# Patient Record
Sex: MALE | Race: WHITE | NOT HISPANIC OR LATINO
[De-identification: names, ages, dates, MRNs, and addresses within clinical notes are randomized per-mention and may not be internally consistent; named-entity substitution may affect disease eponyms.]

---

## 2017-08-03 ENCOUNTER — APPOINTMENT (OUTPATIENT)
Dept: CARDIOLOGY | Facility: CLINIC | Age: 77
End: 2017-08-03
Payer: MEDICARE

## 2017-08-03 VITALS
HEIGHT: 67 IN | WEIGHT: 253 LBS | SYSTOLIC BLOOD PRESSURE: 120 MMHG | BODY MASS INDEX: 39.71 KG/M2 | HEART RATE: 74 BPM | DIASTOLIC BLOOD PRESSURE: 70 MMHG | OXYGEN SATURATION: 97 %

## 2017-08-03 PROCEDURE — 99205 OFFICE O/P NEW HI 60 MIN: CPT

## 2017-08-03 RX ORDER — ALBUTEROL SULFATE 90 UG/1
108 (90 BASE) AEROSOL, METERED RESPIRATORY (INHALATION)
Qty: 1 | Refills: 3 | Status: ACTIVE | COMMUNITY
Start: 2017-08-03

## 2017-08-03 RX ORDER — BUDESONIDE AND FORMOTEROL FUMARATE DIHYDRATE 160; 4.5 UG/1; UG/1
160-4.5 AEROSOL RESPIRATORY (INHALATION) TWICE DAILY
Qty: 1 | Refills: 3 | Status: ACTIVE | COMMUNITY
Start: 2016-11-14

## 2017-08-03 RX ORDER — AZITHROMYCIN 250 MG/1
250 TABLET, FILM COATED ORAL
Qty: 6 | Refills: 0 | Status: COMPLETED | COMMUNITY
Start: 2017-03-22

## 2017-08-03 RX ORDER — OXYCODONE AND ACETAMINOPHEN 5; 325 MG/1; MG/1
5-325 TABLET ORAL
Qty: 30 | Refills: 0 | Status: COMPLETED | COMMUNITY
Start: 2017-06-27

## 2017-08-03 RX ORDER — TIOTROPIUM BROMIDE 18 UG/1
18 CAPSULE ORAL; RESPIRATORY (INHALATION) DAILY
Qty: 14 | Refills: 0 | Status: ACTIVE | COMMUNITY
Start: 2016-11-14

## 2017-08-03 RX ORDER — FUROSEMIDE 20 MG/1
20 TABLET ORAL
Qty: 90 | Refills: 0 | Status: COMPLETED | COMMUNITY
Start: 2017-06-26

## 2017-08-15 ENCOUNTER — APPOINTMENT (OUTPATIENT)
Dept: ORTHOPEDIC SURGERY | Facility: CLINIC | Age: 77
End: 2017-08-15
Payer: MEDICARE

## 2017-08-15 VITALS — WEIGHT: 253 LBS | BODY MASS INDEX: 39.71 KG/M2 | HEIGHT: 67 IN

## 2017-08-15 DIAGNOSIS — M79.652 PAIN IN LEFT THIGH: ICD-10-CM

## 2017-08-15 PROCEDURE — 99203 OFFICE O/P NEW LOW 30 MIN: CPT

## 2017-08-15 PROCEDURE — 73552 X-RAY EXAM OF FEMUR 2/>: CPT | Mod: LT

## 2017-08-15 RX ORDER — OXYCODONE 5 MG/1
5 TABLET ORAL
Qty: 40 | Refills: 0 | Status: ACTIVE | COMMUNITY
Start: 2017-08-15 | End: 1900-01-01

## 2017-09-05 ENCOUNTER — APPOINTMENT (OUTPATIENT)
Dept: ORTHOPEDIC SURGERY | Facility: CLINIC | Age: 77
End: 2017-09-05

## 2018-01-11 ENCOUNTER — APPOINTMENT (OUTPATIENT)
Dept: VASCULAR SURGERY | Facility: CLINIC | Age: 78
End: 2018-01-11

## 2018-04-12 ENCOUNTER — APPOINTMENT (OUTPATIENT)
Dept: CARDIOLOGY | Facility: CLINIC | Age: 78
End: 2018-04-12
Payer: MEDICARE

## 2018-04-12 ENCOUNTER — NON-APPOINTMENT (OUTPATIENT)
Age: 78
End: 2018-04-12

## 2018-04-12 VITALS
WEIGHT: 266 LBS | DIASTOLIC BLOOD PRESSURE: 62 MMHG | SYSTOLIC BLOOD PRESSURE: 116 MMHG | OXYGEN SATURATION: 97 % | HEIGHT: 67 IN | BODY MASS INDEX: 41.75 KG/M2 | HEART RATE: 59 BPM

## 2018-04-12 PROCEDURE — 99215 OFFICE O/P EST HI 40 MIN: CPT

## 2018-04-12 PROCEDURE — 93000 ELECTROCARDIOGRAM COMPLETE: CPT

## 2018-09-13 ENCOUNTER — APPOINTMENT (OUTPATIENT)
Dept: CARDIOLOGY | Facility: CLINIC | Age: 78
End: 2018-09-13
Payer: MEDICARE

## 2018-09-13 ENCOUNTER — NON-APPOINTMENT (OUTPATIENT)
Age: 78
End: 2018-09-13

## 2018-09-13 VITALS
WEIGHT: 277 LBS | HEIGHT: 67 IN | HEART RATE: 63 BPM | BODY MASS INDEX: 43.47 KG/M2 | SYSTOLIC BLOOD PRESSURE: 118 MMHG | OXYGEN SATURATION: 97 % | DIASTOLIC BLOOD PRESSURE: 76 MMHG

## 2018-09-13 PROCEDURE — 99215 OFFICE O/P EST HI 40 MIN: CPT

## 2018-09-13 PROCEDURE — 93000 ELECTROCARDIOGRAM COMPLETE: CPT

## 2018-10-04 ENCOUNTER — APPOINTMENT (OUTPATIENT)
Dept: CARDIOLOGY | Facility: CLINIC | Age: 78
End: 2018-10-04

## 2019-01-02 ENCOUNTER — MEDICATION RENEWAL (OUTPATIENT)
Age: 79
End: 2019-01-02

## 2019-07-23 ENCOUNTER — APPOINTMENT (OUTPATIENT)
Dept: CARDIOLOGY | Facility: CLINIC | Age: 79
End: 2019-07-23
Payer: MEDICARE

## 2019-07-23 ENCOUNTER — NON-APPOINTMENT (OUTPATIENT)
Age: 79
End: 2019-07-23

## 2019-07-23 VITALS
DIASTOLIC BLOOD PRESSURE: 85 MMHG | BODY MASS INDEX: 42.38 KG/M2 | OXYGEN SATURATION: 96 % | SYSTOLIC BLOOD PRESSURE: 140 MMHG | HEART RATE: 97 BPM | WEIGHT: 270 LBS | HEIGHT: 67 IN

## 2019-07-23 DIAGNOSIS — M17.12 UNILATERAL PRIMARY OSTEOARTHRITIS, LEFT KNEE: ICD-10-CM

## 2019-07-23 PROCEDURE — 99215 OFFICE O/P EST HI 40 MIN: CPT

## 2019-07-23 PROCEDURE — 93000 ELECTROCARDIOGRAM COMPLETE: CPT

## 2019-08-26 ENCOUNTER — APPOINTMENT (OUTPATIENT)
Dept: CARDIOLOGY | Facility: CLINIC | Age: 79
End: 2019-08-26

## 2019-09-23 ENCOUNTER — RX RENEWAL (OUTPATIENT)
Age: 79
End: 2019-09-23

## 2019-11-01 ENCOUNTER — APPOINTMENT (OUTPATIENT)
Dept: CARDIOLOGY | Facility: CLINIC | Age: 79
End: 2019-11-01
Payer: MEDICARE

## 2019-11-01 ENCOUNTER — NON-APPOINTMENT (OUTPATIENT)
Age: 79
End: 2019-11-01

## 2019-11-01 VITALS
OXYGEN SATURATION: 98 % | BODY MASS INDEX: 42.13 KG/M2 | HEART RATE: 53 BPM | DIASTOLIC BLOOD PRESSURE: 60 MMHG | SYSTOLIC BLOOD PRESSURE: 126 MMHG | WEIGHT: 269 LBS

## 2019-11-01 DIAGNOSIS — R42 DIZZINESS AND GIDDINESS: ICD-10-CM

## 2019-11-01 PROCEDURE — 99215 OFFICE O/P EST HI 40 MIN: CPT

## 2019-11-01 PROCEDURE — 93000 ELECTROCARDIOGRAM COMPLETE: CPT

## 2019-11-04 ENCOUNTER — MEDICATION RENEWAL (OUTPATIENT)
Age: 79
End: 2019-11-04

## 2019-11-19 ENCOUNTER — RX RENEWAL (OUTPATIENT)
Age: 79
End: 2019-11-19

## 2020-07-21 ENCOUNTER — APPOINTMENT (OUTPATIENT)
Dept: CARDIOLOGY | Facility: CLINIC | Age: 80
End: 2020-07-21

## 2020-07-27 ENCOUNTER — APPOINTMENT (OUTPATIENT)
Dept: CARDIOLOGY | Facility: CLINIC | Age: 80
End: 2020-07-27

## 2020-07-28 ENCOUNTER — NON-APPOINTMENT (OUTPATIENT)
Age: 80
End: 2020-07-28

## 2020-07-28 ENCOUNTER — APPOINTMENT (OUTPATIENT)
Dept: CARDIOLOGY | Facility: CLINIC | Age: 80
End: 2020-07-28
Payer: MEDICARE

## 2020-07-28 VITALS
DIASTOLIC BLOOD PRESSURE: 65 MMHG | OXYGEN SATURATION: 98 % | SYSTOLIC BLOOD PRESSURE: 108 MMHG | WEIGHT: 270 LBS | BODY MASS INDEX: 42.29 KG/M2 | HEART RATE: 90 BPM | TEMPERATURE: 97.5 F

## 2020-07-28 PROCEDURE — 99215 OFFICE O/P EST HI 40 MIN: CPT

## 2020-07-28 RX ORDER — GLYCOPYRROLATE AND FORMOTEROL FUMARATE 9; 4.8 UG/1; UG/1
9-4.8 AEROSOL, METERED RESPIRATORY (INHALATION)
Qty: 32 | Refills: 0 | Status: COMPLETED | COMMUNITY
Start: 2019-05-24

## 2020-07-28 RX ORDER — HYDROCODONE BITARTRATE AND ACETAMINOPHEN 5; 325 MG/1; MG/1
5-325 TABLET ORAL
Qty: 240 | Refills: 0 | Status: COMPLETED | COMMUNITY
Start: 2020-02-07

## 2020-07-28 RX ORDER — CEPHALEXIN 500 MG/1
500 CAPSULE ORAL
Qty: 28 | Refills: 0 | Status: COMPLETED | COMMUNITY
Start: 2020-05-05

## 2020-07-28 RX ORDER — MUPIROCIN 20 MG/G
2 OINTMENT TOPICAL
Qty: 22 | Refills: 0 | Status: COMPLETED | COMMUNITY
Start: 2020-05-15

## 2020-07-28 RX ORDER — LANCETS 33 GAUGE
EACH MISCELLANEOUS
Qty: 100 | Refills: 0 | Status: COMPLETED | COMMUNITY
Start: 2020-04-29

## 2020-07-28 RX ORDER — POTASSIUM CHLORIDE 1500 MG/1
20 TABLET, EXTENDED RELEASE ORAL
Qty: 30 | Refills: 0 | Status: COMPLETED | COMMUNITY
Start: 2020-05-05

## 2020-07-28 RX ORDER — FUROSEMIDE 40 MG/1
40 TABLET ORAL
Qty: 30 | Refills: 0 | Status: COMPLETED | COMMUNITY
Start: 2020-05-05

## 2020-07-28 RX ORDER — BLOOD-GLUCOSE METER
W/DEVICE KIT MISCELLANEOUS
Qty: 1 | Refills: 0 | Status: COMPLETED | COMMUNITY
Start: 2020-04-29

## 2020-07-28 RX ORDER — GLIPIZIDE 5 MG/1
5 TABLET ORAL
Qty: 360 | Refills: 0 | Status: COMPLETED | COMMUNITY
Start: 2019-12-18

## 2020-07-28 RX ORDER — BLOOD SUGAR DIAGNOSTIC
STRIP MISCELLANEOUS
Qty: 100 | Refills: 0 | Status: COMPLETED | COMMUNITY
Start: 2020-04-29

## 2020-08-04 ENCOUNTER — LABORATORY RESULT (OUTPATIENT)
Age: 80
End: 2020-08-04

## 2020-08-04 LAB
ANION GAP SERPL CALC-SCNC: 16 MMOL/L
BASOPHILS # BLD AUTO: 0.07 K/UL
BASOPHILS NFR BLD AUTO: 0.9 %
BUN SERPL-MCNC: 31 MG/DL
CALCIUM SERPL-MCNC: 9.6 MG/DL
CHLORIDE SERPL-SCNC: 100 MMOL/L
CO2 SERPL-SCNC: 25 MMOL/L
CREAT SERPL-MCNC: 1.67 MG/DL
EOSINOPHIL # BLD AUTO: 0.37 K/UL
EOSINOPHIL NFR BLD AUTO: 4.9 %
FERRITIN SERPL-MCNC: 18 NG/ML
GLUCOSE SERPL-MCNC: 118 MG/DL
HCT VFR BLD CALC: 31.6 %
HGB BLD-MCNC: 8.5 G/DL
IMM GRANULOCYTES NFR BLD AUTO: 0.4 %
IRON SATN MFR SERPL: 10 %
IRON SERPL-MCNC: 38 UG/DL
LYMPHOCYTES # BLD AUTO: 1.13 K/UL
LYMPHOCYTES NFR BLD AUTO: 15.1 %
MAN DIFF?: NORMAL
MCHC RBC-ENTMCNC: 21.4 PG
MCHC RBC-ENTMCNC: 26.9 GM/DL
MCV RBC AUTO: 79.4 FL
MONOCYTES # BLD AUTO: 0.7 K/UL
MONOCYTES NFR BLD AUTO: 9.4 %
NEUTROPHILS # BLD AUTO: 5.18 K/UL
NEUTROPHILS NFR BLD AUTO: 69.3 %
PLATELET # BLD AUTO: 322 K/UL
POTASSIUM SERPL-SCNC: 3.8 MMOL/L
RBC # BLD: 3.98 M/UL
RBC # FLD: 21.3 %
SODIUM SERPL-SCNC: 141 MMOL/L
TIBC SERPL-MCNC: 368 UG/DL
UIBC SERPL-MCNC: 330 UG/DL
WBC # FLD AUTO: 7.48 K/UL

## 2020-08-11 LAB
ANION GAP SERPL CALC-SCNC: 18 MMOL/L
BASOPHILS # BLD AUTO: 0.07 K/UL
BASOPHILS NFR BLD AUTO: 0.8 %
BUN SERPL-MCNC: 26 MG/DL
CALCIUM SERPL-MCNC: 9.7 MG/DL
CHLORIDE SERPL-SCNC: 97 MMOL/L
CO2 SERPL-SCNC: 24 MMOL/L
CREAT SERPL-MCNC: 1.57 MG/DL
EOSINOPHIL # BLD AUTO: 0.49 K/UL
EOSINOPHIL NFR BLD AUTO: 5.9 %
GLUCOSE SERPL-MCNC: 163 MG/DL
HCT VFR BLD CALC: 39.4 %
HGB BLD-MCNC: 10.4 G/DL
IMM GRANULOCYTES NFR BLD AUTO: 0.5 %
LYMPHOCYTES # BLD AUTO: 1.12 K/UL
LYMPHOCYTES NFR BLD AUTO: 13.4 %
MAN DIFF?: NORMAL
MCHC RBC-ENTMCNC: 22.1 PG
MCHC RBC-ENTMCNC: 26.4 GM/DL
MCV RBC AUTO: 83.7 FL
MONOCYTES # BLD AUTO: 0.71 K/UL
MONOCYTES NFR BLD AUTO: 8.5 %
NEUTROPHILS # BLD AUTO: 5.92 K/UL
NEUTROPHILS NFR BLD AUTO: 70.9 %
PLATELET # BLD AUTO: 386 K/UL
POTASSIUM SERPL-SCNC: 4.1 MMOL/L
RBC # BLD: 4.71 M/UL
RBC # FLD: 24.2 %
SODIUM SERPL-SCNC: 139 MMOL/L
WBC # FLD AUTO: 8.35 K/UL

## 2020-08-18 ENCOUNTER — NON-APPOINTMENT (OUTPATIENT)
Age: 80
End: 2020-08-18

## 2020-08-18 ENCOUNTER — APPOINTMENT (OUTPATIENT)
Dept: CARDIOLOGY | Facility: CLINIC | Age: 80
End: 2020-08-18
Payer: MEDICARE

## 2020-08-18 VITALS
HEIGHT: 67 IN | WEIGHT: 258 LBS | SYSTOLIC BLOOD PRESSURE: 115 MMHG | DIASTOLIC BLOOD PRESSURE: 72 MMHG | BODY MASS INDEX: 40.49 KG/M2 | OXYGEN SATURATION: 97 % | TEMPERATURE: 98.6 F | HEART RATE: 80 BPM

## 2020-08-18 LAB
ALBUMIN SERPL ELPH-MCNC: 4.5 G/DL
ALP BLD-CCNC: 60 U/L
ALT SERPL-CCNC: 17 U/L
ANION GAP SERPL CALC-SCNC: 16 MMOL/L
AST SERPL-CCNC: 20 U/L
BASOPHILS # BLD AUTO: 0.08 K/UL
BASOPHILS NFR BLD AUTO: 1 %
BILIRUB SERPL-MCNC: 0.5 MG/DL
BUN SERPL-MCNC: 45 MG/DL
CALCIUM SERPL-MCNC: 9.4 MG/DL
CHLORIDE SERPL-SCNC: 99 MMOL/L
CHOLEST SERPL-MCNC: 116 MG/DL
CHOLEST/HDLC SERPL: 3.1 RATIO
CO2 SERPL-SCNC: 24 MMOL/L
CREAT SERPL-MCNC: 2.34 MG/DL
EOSINOPHIL # BLD AUTO: 0.41 K/UL
EOSINOPHIL NFR BLD AUTO: 5.2 %
ESTIMATED AVERAGE GLUCOSE: 111 MG/DL
GLUCOSE SERPL-MCNC: 216 MG/DL
HBA1C MFR BLD HPLC: 5.5 %
HCT VFR BLD CALC: 39 %
HDLC SERPL-MCNC: 38 MG/DL
HGB BLD-MCNC: 10.4 G/DL
IMM GRANULOCYTES NFR BLD AUTO: 0.4 %
IRON SATN MFR SERPL: 13 %
IRON SERPL-MCNC: 50 UG/DL
LDLC SERPL CALC-MCNC: 48 MG/DL
LYMPHOCYTES # BLD AUTO: 1.2 K/UL
LYMPHOCYTES NFR BLD AUTO: 15.2 %
MAN DIFF?: NORMAL
MCHC RBC-ENTMCNC: 22.8 PG
MCHC RBC-ENTMCNC: 26.7 GM/DL
MCV RBC AUTO: 85.3 FL
MONOCYTES # BLD AUTO: 0.8 K/UL
MONOCYTES NFR BLD AUTO: 10.2 %
NEUTROPHILS # BLD AUTO: 5.36 K/UL
NEUTROPHILS NFR BLD AUTO: 68 %
PLATELET # BLD AUTO: 266 K/UL
POTASSIUM SERPL-SCNC: 3.8 MMOL/L
PROT SERPL-MCNC: 6.5 G/DL
PSA FREE FLD-MCNC: 53 %
PSA FREE SERPL-MCNC: 0.19 NG/ML
PSA SERPL-MCNC: 0.35 NG/ML
RBC # BLD: 4.57 M/UL
RBC # FLD: 26.2 %
SODIUM SERPL-SCNC: 140 MMOL/L
T4 SERPL-MCNC: 8.2 UG/DL
TIBC SERPL-MCNC: 372 UG/DL
TRIGL SERPL-MCNC: 153 MG/DL
TSH SERPL-ACNC: 6.16 UIU/ML
UIBC SERPL-MCNC: 322 UG/DL
WBC # FLD AUTO: 7.88 K/UL

## 2020-08-18 PROCEDURE — 99215 OFFICE O/P EST HI 40 MIN: CPT

## 2020-08-18 PROCEDURE — 93000 ELECTROCARDIOGRAM COMPLETE: CPT

## 2020-12-04 ENCOUNTER — RX RENEWAL (OUTPATIENT)
Age: 80
End: 2020-12-04

## 2021-03-03 ENCOUNTER — RX RENEWAL (OUTPATIENT)
Age: 81
End: 2021-03-03

## 2021-03-15 ENCOUNTER — APPOINTMENT (OUTPATIENT)
Dept: CARDIOLOGY | Facility: CLINIC | Age: 81
End: 2021-03-15
Payer: MEDICARE

## 2021-03-15 ENCOUNTER — NON-APPOINTMENT (OUTPATIENT)
Age: 81
End: 2021-03-15

## 2021-03-15 VITALS
SYSTOLIC BLOOD PRESSURE: 112 MMHG | OXYGEN SATURATION: 98 % | BODY MASS INDEX: 37.2 KG/M2 | HEIGHT: 67 IN | DIASTOLIC BLOOD PRESSURE: 70 MMHG | WEIGHT: 237 LBS | TEMPERATURE: 98 F | HEART RATE: 83 BPM

## 2021-03-15 DIAGNOSIS — J44.9 CHRONIC OBSTRUCTIVE PULMONARY DISEASE, UNSPECIFIED: ICD-10-CM

## 2021-03-15 PROCEDURE — 36415 COLL VENOUS BLD VENIPUNCTURE: CPT

## 2021-03-15 PROCEDURE — 99214 OFFICE O/P EST MOD 30 MIN: CPT

## 2021-03-15 PROCEDURE — 93000 ELECTROCARDIOGRAM COMPLETE: CPT | Mod: 59

## 2021-03-16 LAB
ALBUMIN SERPL ELPH-MCNC: 4.5 G/DL
ALP BLD-CCNC: 89 U/L
ALT SERPL-CCNC: 32 U/L
ANION GAP SERPL CALC-SCNC: 15 MMOL/L
AST SERPL-CCNC: 31 U/L
BASOPHILS # BLD AUTO: 0.04 K/UL
BASOPHILS NFR BLD AUTO: 0.6 %
BILIRUB SERPL-MCNC: 0.4 MG/DL
BUN SERPL-MCNC: 21 MG/DL
CALCIUM SERPL-MCNC: 9.8 MG/DL
CHLORIDE SERPL-SCNC: 104 MMOL/L
CHOLEST SERPL-MCNC: 149 MG/DL
CO2 SERPL-SCNC: 24 MMOL/L
CREAT SERPL-MCNC: 1.74 MG/DL
EOSINOPHIL # BLD AUTO: 0.16 K/UL
EOSINOPHIL NFR BLD AUTO: 2.3 %
ESTIMATED AVERAGE GLUCOSE: 146 MG/DL
GLUCOSE SERPL-MCNC: 104 MG/DL
HBA1C MFR BLD HPLC: 6.7 %
HCT VFR BLD CALC: 46.9 %
HDLC SERPL-MCNC: 45 MG/DL
HGB BLD-MCNC: 15 G/DL
IMM GRANULOCYTES NFR BLD AUTO: 0.4 %
LDLC SERPL CALC-MCNC: 76 MG/DL
LYMPHOCYTES # BLD AUTO: 1.17 K/UL
LYMPHOCYTES NFR BLD AUTO: 16.6 %
MAN DIFF?: NORMAL
MCHC RBC-ENTMCNC: 30.4 PG
MCHC RBC-ENTMCNC: 32 GM/DL
MCV RBC AUTO: 94.9 FL
MONOCYTES # BLD AUTO: 0.63 K/UL
MONOCYTES NFR BLD AUTO: 9 %
NEUTROPHILS # BLD AUTO: 5 K/UL
NEUTROPHILS NFR BLD AUTO: 71.1 %
NONHDLC SERPL-MCNC: 104 MG/DL
PLATELET # BLD AUTO: 258 K/UL
POTASSIUM SERPL-SCNC: 4.2 MMOL/L
PROT SERPL-MCNC: 7.2 G/DL
RBC # BLD: 4.94 M/UL
RBC # FLD: 15.2 %
SODIUM SERPL-SCNC: 143 MMOL/L
T4 SERPL-MCNC: 8.8 UG/DL
TRIGL SERPL-MCNC: 142 MG/DL
TSH SERPL-ACNC: 7.54 UIU/ML
WBC # FLD AUTO: 7.03 K/UL

## 2021-03-23 ENCOUNTER — RX RENEWAL (OUTPATIENT)
Age: 81
End: 2021-03-23

## 2021-03-31 DIAGNOSIS — F51.04 PSYCHOPHYSIOLOGIC INSOMNIA: ICD-10-CM

## 2021-06-09 ENCOUNTER — RX RENEWAL (OUTPATIENT)
Age: 81
End: 2021-06-09

## 2021-09-01 ENCOUNTER — APPOINTMENT (OUTPATIENT)
Dept: CARDIOLOGY | Facility: CLINIC | Age: 81
End: 2021-09-01
Payer: MEDICARE

## 2021-09-01 ENCOUNTER — RX RENEWAL (OUTPATIENT)
Age: 81
End: 2021-09-01

## 2021-09-01 ENCOUNTER — NON-APPOINTMENT (OUTPATIENT)
Age: 81
End: 2021-09-01

## 2021-09-01 VITALS
OXYGEN SATURATION: 97 % | BODY MASS INDEX: 36.34 KG/M2 | DIASTOLIC BLOOD PRESSURE: 62 MMHG | HEART RATE: 56 BPM | SYSTOLIC BLOOD PRESSURE: 118 MMHG | WEIGHT: 232 LBS

## 2021-09-01 PROCEDURE — 99214 OFFICE O/P EST MOD 30 MIN: CPT

## 2021-09-01 PROCEDURE — 93000 ELECTROCARDIOGRAM COMPLETE: CPT

## 2021-09-01 RX ORDER — METFORMIN ER 500 MG 500 MG/1
500 TABLET ORAL
Qty: 360 | Refills: 1 | Status: DISCONTINUED | COMMUNITY
Start: 2017-01-06 | End: 2021-09-01

## 2021-09-21 ENCOUNTER — RX RENEWAL (OUTPATIENT)
Age: 81
End: 2021-09-21

## 2021-11-29 ENCOUNTER — RX RENEWAL (OUTPATIENT)
Age: 81
End: 2021-11-29

## 2021-12-06 ENCOUNTER — RX RENEWAL (OUTPATIENT)
Age: 81
End: 2021-12-06

## 2022-01-05 ENCOUNTER — NON-APPOINTMENT (OUTPATIENT)
Age: 82
End: 2022-01-05

## 2022-01-18 ENCOUNTER — RX RENEWAL (OUTPATIENT)
Age: 82
End: 2022-01-18

## 2022-03-29 ENCOUNTER — APPOINTMENT (OUTPATIENT)
Dept: CARDIOLOGY | Facility: CLINIC | Age: 82
End: 2022-03-29

## 2022-05-06 ENCOUNTER — RX RENEWAL (OUTPATIENT)
Age: 82
End: 2022-05-06

## 2022-05-18 ENCOUNTER — APPOINTMENT (OUTPATIENT)
Dept: CARDIOLOGY | Facility: CLINIC | Age: 82
End: 2022-05-18

## 2022-05-27 ENCOUNTER — RX RENEWAL (OUTPATIENT)
Age: 82
End: 2022-05-27

## 2022-06-06 ENCOUNTER — RX RENEWAL (OUTPATIENT)
Age: 82
End: 2022-06-06

## 2022-06-06 RX ORDER — METOPROLOL TARTRATE 50 MG/1
50 TABLET, FILM COATED ORAL
Qty: 180 | Refills: 3 | Status: ACTIVE | COMMUNITY
Start: 2016-08-02 | End: 1900-01-01

## 2022-06-27 ENCOUNTER — APPOINTMENT (OUTPATIENT)
Dept: CARDIOLOGY | Facility: CLINIC | Age: 82
End: 2022-06-27

## 2022-09-14 ENCOUNTER — APPOINTMENT (OUTPATIENT)
Dept: CARDIOLOGY | Facility: CLINIC | Age: 82
End: 2022-09-14

## 2022-09-14 ENCOUNTER — NON-APPOINTMENT (OUTPATIENT)
Age: 82
End: 2022-09-14

## 2022-09-14 VITALS
SYSTOLIC BLOOD PRESSURE: 111 MMHG | BODY MASS INDEX: 39.16 KG/M2 | OXYGEN SATURATION: 96 % | HEART RATE: 101 BPM | WEIGHT: 250 LBS | DIASTOLIC BLOOD PRESSURE: 76 MMHG

## 2022-09-14 LAB
ALBUMIN SERPL ELPH-MCNC: 4.3 G/DL
ALP BLD-CCNC: 73 U/L
ALT SERPL-CCNC: 17 U/L
ANION GAP SERPL CALC-SCNC: 16 MMOL/L
AST SERPL-CCNC: 14 U/L
BILIRUB SERPL-MCNC: 0.5 MG/DL
BUN SERPL-MCNC: 22 MG/DL
CALCIUM SERPL-MCNC: 9.6 MG/DL
CHLORIDE SERPL-SCNC: 104 MMOL/L
CHOLEST SERPL-MCNC: 134 MG/DL
CO2 SERPL-SCNC: 20 MMOL/L
CREAT SERPL-MCNC: 1.53 MG/DL
EGFR: 46 ML/MIN/1.73M2
GLUCOSE SERPL-MCNC: 157 MG/DL
HDLC SERPL-MCNC: 52 MG/DL
LDLC SERPL CALC-MCNC: 55 MG/DL
NONHDLC SERPL-MCNC: 82 MG/DL
POTASSIUM SERPL-SCNC: 4.6 MMOL/L
PROT SERPL-MCNC: 6.5 G/DL
PSA FREE FLD-MCNC: 31 %
PSA FREE SERPL-MCNC: 0.18 NG/ML
PSA SERPL-MCNC: 0.56 NG/ML
SODIUM SERPL-SCNC: 140 MMOL/L
T4 SERPL-MCNC: 8.1 UG/DL
TRIGL SERPL-MCNC: 137 MG/DL
TSH SERPL-ACNC: 5.42 UIU/ML

## 2022-09-14 PROCEDURE — 93000 ELECTROCARDIOGRAM COMPLETE: CPT

## 2022-09-14 PROCEDURE — 99215 OFFICE O/P EST HI 40 MIN: CPT

## 2022-09-14 RX ORDER — PIOGLITAZONE HYDROCHLORIDE 15 MG/1
15 TABLET ORAL DAILY
Qty: 90 | Refills: 0 | Status: DISCONTINUED | COMMUNITY
Start: 2017-04-06 | End: 2022-09-14

## 2022-09-14 RX ORDER — EMPAGLIFLOZIN 10 MG/1
10 TABLET, FILM COATED ORAL
Qty: 90 | Refills: 1 | Status: ACTIVE | COMMUNITY
Start: 2022-09-14

## 2022-09-14 RX ORDER — METFORMIN ER 500 MG 500 MG/1
500 TABLET ORAL DAILY
Qty: 90 | Refills: 3 | Status: ACTIVE | COMMUNITY
Start: 2022-09-14

## 2022-09-14 RX ORDER — CANAGLIFLOZIN 100 MG/1
100 TABLET, FILM COATED ORAL
Qty: 90 | Refills: 3 | Status: DISCONTINUED | COMMUNITY
Start: 2017-06-20 | End: 2022-09-14

## 2022-09-15 LAB
BASOPHILS # BLD AUTO: 0.07 K/UL
BASOPHILS NFR BLD AUTO: 1 %
EOSINOPHIL # BLD AUTO: 0.16 K/UL
EOSINOPHIL NFR BLD AUTO: 2.2 %
ESTIMATED AVERAGE GLUCOSE: 174 MG/DL
HBA1C MFR BLD HPLC: 7.7 %
HCT VFR BLD CALC: 39.1 %
HGB BLD-MCNC: 11.3 G/DL
IMM GRANULOCYTES NFR BLD AUTO: 2.2 %
LYMPHOCYTES # BLD AUTO: 0.84 K/UL
LYMPHOCYTES NFR BLD AUTO: 11.7 %
MAN DIFF?: NORMAL
MCHC RBC-ENTMCNC: 26.4 PG
MCHC RBC-ENTMCNC: 28.9 GM/DL
MCV RBC AUTO: 91.4 FL
MONOCYTES # BLD AUTO: 0.92 K/UL
MONOCYTES NFR BLD AUTO: 12.9 %
NEUTROPHILS # BLD AUTO: 5 K/UL
NEUTROPHILS NFR BLD AUTO: 70 %
PLATELET # BLD AUTO: 348 K/UL
RBC # BLD: 4.28 M/UL
RBC # FLD: 22.3 %
WBC # FLD AUTO: 7.15 K/UL

## 2022-09-16 ENCOUNTER — RX RENEWAL (OUTPATIENT)
Age: 82
End: 2022-09-16

## 2022-10-11 ENCOUNTER — NON-APPOINTMENT (OUTPATIENT)
Age: 82
End: 2022-10-11

## 2022-10-13 RX ORDER — PHENTERMINE HYDROCHLORIDE 37.5 MG/1
37.5 TABLET ORAL
Qty: 30 | Refills: 0 | Status: DISCONTINUED | COMMUNITY
Start: 2019-07-23 | End: 2022-10-13

## 2022-10-19 ENCOUNTER — NON-APPOINTMENT (OUTPATIENT)
Age: 82
End: 2022-10-19

## 2022-10-28 ENCOUNTER — OUTPATIENT (OUTPATIENT)
Dept: OUTPATIENT SERVICES | Facility: HOSPITAL | Age: 82
LOS: 1 days | End: 2022-10-28

## 2022-10-28 VITALS
HEART RATE: 73 BPM | HEIGHT: 66 IN | WEIGHT: 244.93 LBS | TEMPERATURE: 97 F | OXYGEN SATURATION: 98 % | DIASTOLIC BLOOD PRESSURE: 80 MMHG | RESPIRATION RATE: 15 BRPM | SYSTOLIC BLOOD PRESSURE: 126 MMHG

## 2022-10-28 DIAGNOSIS — G47.33 OBSTRUCTIVE SLEEP APNEA (ADULT) (PEDIATRIC): ICD-10-CM

## 2022-10-28 DIAGNOSIS — R31.0 GROSS HEMATURIA: ICD-10-CM

## 2022-10-28 DIAGNOSIS — Z98.890 OTHER SPECIFIED POSTPROCEDURAL STATES: Chronic | ICD-10-CM

## 2022-10-28 DIAGNOSIS — I25.10 ATHEROSCLEROTIC HEART DISEASE OF NATIVE CORONARY ARTERY WITHOUT ANGINA PECTORIS: ICD-10-CM

## 2022-10-28 DIAGNOSIS — J44.9 CHRONIC OBSTRUCTIVE PULMONARY DISEASE, UNSPECIFIED: ICD-10-CM

## 2022-10-28 DIAGNOSIS — E11.9 TYPE 2 DIABETES MELLITUS WITHOUT COMPLICATIONS: ICD-10-CM

## 2022-10-28 DIAGNOSIS — M19.90 UNSPECIFIED OSTEOARTHRITIS, UNSPECIFIED SITE: ICD-10-CM

## 2022-10-28 DIAGNOSIS — Z90.49 ACQUIRED ABSENCE OF OTHER SPECIFIED PARTS OF DIGESTIVE TRACT: Chronic | ICD-10-CM

## 2022-10-28 LAB
A1C WITH ESTIMATED AVERAGE GLUCOSE RESULT: 7.2 % — HIGH (ref 4–5.6)
ALBUMIN SERPL ELPH-MCNC: 4.2 G/DL — SIGNIFICANT CHANGE UP (ref 3.3–5)
ALP SERPL-CCNC: 69 U/L — SIGNIFICANT CHANGE UP (ref 40–120)
ALT FLD-CCNC: 21 U/L — SIGNIFICANT CHANGE UP (ref 4–41)
ANION GAP SERPL CALC-SCNC: 10 MMOL/L — SIGNIFICANT CHANGE UP (ref 7–14)
APPEARANCE UR: CLEAR — SIGNIFICANT CHANGE UP
AST SERPL-CCNC: 13 U/L — SIGNIFICANT CHANGE UP (ref 4–40)
BILIRUB SERPL-MCNC: 0.9 MG/DL — SIGNIFICANT CHANGE UP (ref 0.2–1.2)
BILIRUB UR-MCNC: NEGATIVE — SIGNIFICANT CHANGE UP
BUN SERPL-MCNC: 25 MG/DL — HIGH (ref 7–23)
CALCIUM SERPL-MCNC: 9.6 MG/DL — SIGNIFICANT CHANGE UP (ref 8.4–10.5)
CHLORIDE SERPL-SCNC: 102 MMOL/L — SIGNIFICANT CHANGE UP (ref 98–107)
CO2 SERPL-SCNC: 27 MMOL/L — SIGNIFICANT CHANGE UP (ref 22–31)
COLOR SPEC: YELLOW — SIGNIFICANT CHANGE UP
CREAT SERPL-MCNC: 1.33 MG/DL — HIGH (ref 0.5–1.3)
DIFF PNL FLD: NEGATIVE — SIGNIFICANT CHANGE UP
EGFR: 54 ML/MIN/1.73M2 — LOW
EPI CELLS # UR: SIGNIFICANT CHANGE UP
ESTIMATED AVERAGE GLUCOSE: 160 — SIGNIFICANT CHANGE UP
GLUCOSE SERPL-MCNC: 178 MG/DL — HIGH (ref 70–99)
GLUCOSE UR QL: ABNORMAL
HCT VFR BLD CALC: 34.6 % — LOW (ref 39–50)
HGB BLD-MCNC: 10.4 G/DL — LOW (ref 13–17)
HYALINE CASTS # UR AUTO: SIGNIFICANT CHANGE UP (ref 0–7)
KETONES UR-MCNC: NEGATIVE — SIGNIFICANT CHANGE UP
LEUKOCYTE ESTERASE UR-ACNC: NEGATIVE — SIGNIFICANT CHANGE UP
MCHC RBC-ENTMCNC: 28.3 PG — SIGNIFICANT CHANGE UP (ref 27–34)
MCHC RBC-ENTMCNC: 30.1 GM/DL — LOW (ref 32–36)
MCV RBC AUTO: 94.3 FL — SIGNIFICANT CHANGE UP (ref 80–100)
NITRITE UR-MCNC: NEGATIVE — SIGNIFICANT CHANGE UP
NRBC # BLD: 0 /100 WBCS — SIGNIFICANT CHANGE UP (ref 0–0)
NRBC # FLD: 0 K/UL — SIGNIFICANT CHANGE UP (ref 0–0)
PH UR: 6 — SIGNIFICANT CHANGE UP (ref 5–8)
PLATELET # BLD AUTO: 189 K/UL — SIGNIFICANT CHANGE UP (ref 150–400)
POTASSIUM SERPL-MCNC: 4.2 MMOL/L — SIGNIFICANT CHANGE UP (ref 3.5–5.3)
POTASSIUM SERPL-SCNC: 4.2 MMOL/L — SIGNIFICANT CHANGE UP (ref 3.5–5.3)
PROT SERPL-MCNC: 6.8 G/DL — SIGNIFICANT CHANGE UP (ref 6–8.3)
PROT UR-MCNC: ABNORMAL
RBC # BLD: 3.67 M/UL — LOW (ref 4.2–5.8)
RBC # FLD: 21.3 % — HIGH (ref 10.3–14.5)
RBC CASTS # UR COMP ASSIST: SIGNIFICANT CHANGE UP /HPF (ref 0–4)
SODIUM SERPL-SCNC: 139 MMOL/L — SIGNIFICANT CHANGE UP (ref 135–145)
SP GR SPEC: 1.02 — SIGNIFICANT CHANGE UP (ref 1.01–1.05)
UROBILINOGEN FLD QL: SIGNIFICANT CHANGE UP
WBC # BLD: 6.73 K/UL — SIGNIFICANT CHANGE UP (ref 3.8–10.5)
WBC # FLD AUTO: 6.73 K/UL — SIGNIFICANT CHANGE UP (ref 3.8–10.5)
WBC UR QL: SIGNIFICANT CHANGE UP /HPF (ref 0–5)

## 2022-10-28 RX ORDER — SODIUM CHLORIDE 9 MG/ML
1000 INJECTION, SOLUTION INTRAVENOUS
Refills: 0 | Status: DISCONTINUED | OUTPATIENT
Start: 2022-11-08 | End: 2022-11-22

## 2022-10-28 NOTE — H&P PST ADULT - PROBLEM SELECTOR PLAN 5
Patient instructed to hold Glipizide the night before and the morning of procedure. Hold Metformin and Januvia on the DOS. Pt stated understanding.  Check fingerstick DOS    Patient reports that he takes Mounjaro once week Thursday and he doesn't take Jardiance now.

## 2022-10-28 NOTE — H&P PST ADULT - PROBLEM SELECTOR PLAN 3
Patient with HTN, HLD, CAD- s/p stent x 1 ---20 years ago, Afib on Xarelto- pt with occasional DOBSON, advanced age and recent weight gain- requesting cardiology evaluation  and pre op Xarelto recommendations prior to surgery.    Will obtain ECHO results from Dr Penn  Last EKG in chart. Patient with HTN, HLD, CAD- s/p stent x 1 ---20 years ago, Afib on Xarelto- pt with occasional DOBSON, advanced age and recent weight gain- requesting cardiology evaluation  and pre op Xarelto recommendations prior to surgery.    Will obtain ECHO results from Dr Penn  Last EKG in chart.    Continue aspirin. Patient with HTN, HLD, CAD- s/p stent x 1 ---20 years ago, Afib on Xarelto-   pt with occasional DOBSON, advanced age and recent weight gain- requesting cardiology evaluation  and pre op Xarelto recommendations prior to surgery.    Last EKG and ECHO results in chart.    Continue aspirin. Patient with HTN, HLD, CAD- s/p stent x 1 ---20 years ago, Afib on Xarelto-   pt with occasional DOBSON, advanced age and recent 14 lbs weight gain in 3 months- requesting cardiology evaluation  and pre op Xarelto recommendations prior to surgery.    Last EKG and ECHO results in chart.    Continue aspirin.

## 2022-10-28 NOTE — H&P PST ADULT - ASSESSMENT
82 year old male, poor historian, with HTN, HLD,RACHEL, COPD, DM2, presents to PST, with pre op diagnosis of  Gross hematuria, for pre op evaluation prior to scheduled procedure- cystoscopy, bladder biopsy, possible transurethral resection of bladder tumor with Dr Wagoner.

## 2022-10-28 NOTE — H&P PST ADULT - OTHER CARE PROVIDERS
cardiologist Dr Penn-  [phone], rheumatologist Dr Triplett - [phone] cardiologist Dr Penn-  [phone], rheumatologist Dr Triplett - [phone], rheum- Dr Dennis

## 2022-10-28 NOTE — H&P PST ADULT - GENERAL COMMENTS
pt reports he gained 14 pounds - in 3 months, cardiologist is aware pt reports he gained 14 pounds - in last 3 months, cardiologist is aware

## 2022-10-28 NOTE — H&P PST ADULT - PROBLEM SELECTOR PLAN 1
Patient is tentatively scheduled for cystoscopy, bladder biopsy, possible transurethral resection of bladder tumor with Dr Wagoner  on 11/08/2022.    Pre-op instructions provided. Pt given verbal and written instructions with teach back on pepcid. Pt verbalized understanding with return demonstration.    Routine Covid PCR test ordered .Instructions regarding covid PCR test and locations for covid testing site provided. Pt verbalized understanding Patient is tentatively scheduled for cystoscopy, bladder biopsy, possible transurethral resection of bladder tumor with Dr Wagoner  on 11/08/2022.    Pre-op instructions provided. Pt given verbal and written instructions with teach back on Pepcid. Pt verbalized understanding with return demonstration.    Routine Covid PCR test ordered .Instructions regarding covid PCR test and locations for covid testing site provided. Pt verbalized understanding

## 2022-10-28 NOTE — H&P PST ADULT - HISTORY OF PRESENT ILLNESS
82 year old male, poor historian, with HTN, HLD,RACHEL, COPD, DM2, presents to PST, with pre op diagnosis of  Gross hematuria, for pre op evaluation prior to scheduled procedure- cystoscopy, bladder biopsy, possible transurethral resection of bladder tumor with Dr Wagoner. 82 year old male, with PMH of Afib on Xarelto HTN, HLD, RACHEL, COPD, DM2, presents to PST, with pre op diagnosis of  Gross hematuria, for pre op evaluation prior to scheduled procedure- cystoscopy, bladder biopsy, possible transurethral resection of bladder tumor with Dr Wagoner.

## 2022-10-28 NOTE — H&P PST ADULT - LAB RESULTS AND INTERPRETATION
Office Progress Note    Patient: Thierry Gaviria   Medical Record Number: [id number]  YOB: 1960  Date of Visit: 12/6/2017    PROBLEM LIST:  No problems updated.    HPI:  Thierry Gaviria is a 57 year old male who presents for follow up of his CBC.   He feels well.    ALLERGIES:  No Known Allergies       Current Outpatient Prescriptions   Medication Sig Dispense Refill   • NIFEdipine (PROCARDIA XL) 30 MG 24 hr tablet Take 2 tablets by mouth daily. 180 tablet 3   • cholecalciferol (VITAMIN D3) 1000 UNITS tablet Take 1 tablet by mouth daily.     • omeprazole (PRILOSEC) 40 MG capsule Take 1 capsule by mouth daily. 30 capsule 11   • metoPROLOL (TOPROL-XL) 50 MG 24 hr tablet Take 1 tablet by mouth daily. 90 tablet 3   • enalapril (VASOTEC) 10 MG tablet Take 1 tablet by mouth 2 times daily. 180 tablet 3   • IBUPROFEN PO Take by mouth as needed.      • Multiple Vitamins-Minerals (DAILY MULTI PO) Take  by mouth daily.       No current facility-administered medications for this visit.        Social History     Social History   • Marital status:      Spouse name: N/A   • Number of children: N/A   • Years of education: N/A     Occupational History   • Not on file.     Social History Main Topics   • Smoking status: Never Smoker   • Smokeless tobacco: Never Used   • Alcohol use No      Comment: Socially   • Drug use: No   • Sexual activity: Not on file     Other Topics Concern   • Not on file     Social History Narrative   • No narrative on file       Family History   Problem Relation Age of Onset   • OTHER Father      Marfans   • Heart disease Father    • * Father      Marfan's   • Heart disease Mother    • Hypertension Mother    • * Sister      Marfan's   • Aneurysm Son      arotic root   • * Son      Marfan's   • * Brother      Marfan's   • * Brother      Marfan's   • * Brother      Marfan's   • * Brother      Marfan's   • * Brother      Marfan's   • * Son      Marfan's   • NEGATIVE FAMILY HX OF Other       Colon Cancer       ROS:  As documented by MA       PHYSICAL EXAM:       GENERAL:  He is fit  Blood pressure 160/80, pulse 80, temperature 97.2 °F (36.2 °C), height 6' 2\" (1.88 m), weight 111.6 kg., hypertension, Body mass index is 31.58 kg/m².    LABS:    Recent Labs  Lab 12/06/17  1042 11/08/17  0832 11/03/17  1515   WBC 11.0 11.0 16.5*   RBC 4.36* 3.37* 2.84*   HGB 12.2* 9.8* 8.5*   HCT 39.4 31.5* 26.7*   MCV 90.4 93.5 94.0   * 1,192* 1,251*   ANEUT 6.2 7.4 11.8*   ALYMS 2.5 2.0 2.2   ANALILIA 1.4* 1.2* 1.5*   AEOS 0.8* 0.4 0.9*   ABASO 0.1 0.1 0.1       Recent Labs  Lab 11/03/17  1515 05/31/17  1039 04/04/17  0724   GLUCOSE 137* 125* 109*   SODIUM 138 140 143   POTASSIUM 5.0 4.1 4.1   CHLORIDE 102 101 104   BUN 14 14 16   CREATININE 1.08 1.17 1.17   ANIONGAP 13 14 15   CALCIUM 8.4 8.8 8.8   GLOB 3.7  --  3.6   ALBUMIN 2.7*  --  3.5*   AST 69*  --  59*   ALKPT 193*  --  67   GPT 47  --  34   URIC  --  7.0  --    RESR  --   --  20*           ASSESSMENT/PLANS:     No problem-specific Assessment & Plan notes found for this encounter.    Previously, I checked his BCR-ABL.  The BCR-ABL was negative suggesting that he does not have CML.   His counts have vastly improved over the last month.  I believe that he does not have a hematologic malignancy and I asked him to follow up with Dr Zamudio.    Orders    No orders of the defined types were placed in this encounter.    Eye Problem:glasses or contacts  ENT Problem:negative  Cardiovascular problem:negative  Respiratory problem:negative  Gastrointestinal problem:negative GI  Genitourinary problem:negative  Musculoskeletal problem:back pain and arthritis  Integumentary problem:negative  Neurological problem:negative  Psychiatric problem:negative  Endocrine problem:negative  Hematologic and/or Lymphatic problem:negative    CBC, CMP, A1C, UA, Urine culture sent

## 2022-10-28 NOTE — H&P PST ADULT - NSICDXPASTSURGICALHX_GEN_ALL_CORE_FT
PAST SURGICAL HISTORY:  H/O coronary angiogram 20 years ago x 1 stent    S/P cholecystectomy     S/P endoscopy

## 2022-10-28 NOTE — H&P PST ADULT - MUSCULOSKELETAL COMMENTS
pt reports he takes oxycodone for chronic pain on his right hip pt reports he taking  oxycodone for chronic pain on his right hip

## 2022-10-28 NOTE — H&P PST ADULT - GENITOURINARY COMMENTS
pt reports of hematuria in past, and some growth in bladder. not examined, pre op diagnosis of gross hematuria pt reports of "occasional hematuria in past, and has  growth in bladder.- plan for removing it"

## 2022-10-28 NOTE — H&P PST ADULT - PROBLEM SELECTOR PLAN 6
Patient states he has arthritis and on methotrexate, pending appointment next week. Patient states he has arthritis and on methotrexate, pending rheumatology appointment next week.

## 2022-10-28 NOTE — H&P PST ADULT - NSICDXPASTMEDICALHX_GEN_ALL_CORE_FT
PAST MEDICAL HISTORY:  COPD, mild     Hematuria     Hyperlipidemia     Hypertension     RACHEL (obstructive sleep apnea)     Severe obesity (BMI >= 40)     Type 2 diabetes mellitus      PAST MEDICAL HISTORY:  Arthritis     COPD, mild     Hematuria     Hyperlipidemia     Hypertension     RACHEL (obstructive sleep apnea)     Severe obesity (BMI >= 40)     Type 2 diabetes mellitus      PAST MEDICAL HISTORY:  Afib     Anemia     Anxiety and depression     Arthritis     COPD, mild     Hematuria     Hyperlipidemia     Hypertension     RACHEL (obstructive sleep apnea)     Severe obesity (BMI >= 40)     Type 2 diabetes mellitus      PAST MEDICAL HISTORY:  Afib     Anemia     Anxiety and depression     Arthritis     COPD, mild     Hematuria     Hyperlipidemia     Hypertension     RACHEL (obstructive sleep apnea) mild as per pt- never used CPAP    Severe obesity (BMI >= 40)     Type 2 diabetes mellitus

## 2022-10-29 LAB
CULTURE RESULTS: SIGNIFICANT CHANGE UP
SPECIMEN SOURCE: SIGNIFICANT CHANGE UP

## 2022-10-31 PROBLEM — I48.91 UNSPECIFIED ATRIAL FIBRILLATION: Chronic | Status: ACTIVE | Noted: 2022-10-28

## 2022-10-31 PROBLEM — E78.5 HYPERLIPIDEMIA, UNSPECIFIED: Chronic | Status: ACTIVE | Noted: 2022-10-28

## 2022-10-31 PROBLEM — J44.9 CHRONIC OBSTRUCTIVE PULMONARY DISEASE, UNSPECIFIED: Chronic | Status: ACTIVE | Noted: 2022-10-28

## 2022-10-31 PROBLEM — F41.9 ANXIETY DISORDER, UNSPECIFIED: Chronic | Status: ACTIVE | Noted: 2022-10-28

## 2022-10-31 PROBLEM — E66.01 MORBID (SEVERE) OBESITY DUE TO EXCESS CALORIES: Chronic | Status: ACTIVE | Noted: 2022-10-28

## 2022-10-31 PROBLEM — D64.9 ANEMIA, UNSPECIFIED: Chronic | Status: ACTIVE | Noted: 2022-10-28

## 2022-10-31 PROBLEM — G47.33 OBSTRUCTIVE SLEEP APNEA (ADULT) (PEDIATRIC): Chronic | Status: ACTIVE | Noted: 2022-10-28

## 2022-10-31 PROBLEM — E11.9 TYPE 2 DIABETES MELLITUS WITHOUT COMPLICATIONS: Chronic | Status: ACTIVE | Noted: 2022-10-28

## 2022-10-31 PROBLEM — R31.9 HEMATURIA, UNSPECIFIED: Chronic | Status: ACTIVE | Noted: 2022-10-28

## 2022-10-31 PROBLEM — I10 ESSENTIAL (PRIMARY) HYPERTENSION: Chronic | Status: ACTIVE | Noted: 2022-10-28

## 2022-10-31 PROBLEM — M19.90 UNSPECIFIED OSTEOARTHRITIS, UNSPECIFIED SITE: Chronic | Status: ACTIVE | Noted: 2022-10-28

## 2022-11-02 ENCOUNTER — NON-APPOINTMENT (OUTPATIENT)
Age: 82
End: 2022-11-02

## 2022-11-02 ENCOUNTER — RX RENEWAL (OUTPATIENT)
Age: 82
End: 2022-11-02

## 2022-11-02 ENCOUNTER — APPOINTMENT (OUTPATIENT)
Dept: CARDIOLOGY | Facility: CLINIC | Age: 82
End: 2022-11-02

## 2022-11-02 VITALS
SYSTOLIC BLOOD PRESSURE: 110 MMHG | HEART RATE: 100 BPM | BODY MASS INDEX: 37.75 KG/M2 | DIASTOLIC BLOOD PRESSURE: 60 MMHG | WEIGHT: 241 LBS | OXYGEN SATURATION: 97 %

## 2022-11-02 PROCEDURE — 99214 OFFICE O/P EST MOD 30 MIN: CPT

## 2022-11-02 PROCEDURE — 93000 ELECTROCARDIOGRAM COMPLETE: CPT

## 2022-11-02 RX ORDER — VORTIOXETINE 5 MG/1
5 TABLET, FILM COATED ORAL
Qty: 30 | Refills: 0 | Status: COMPLETED | COMMUNITY
Start: 2022-10-14

## 2022-11-02 RX ORDER — SILVER SULFADIAZINE 10 MG/G
1 CREAM TOPICAL
Qty: 50 | Refills: 0 | Status: COMPLETED | COMMUNITY
Start: 2022-08-30

## 2022-11-02 RX ORDER — METFORMIN HYDROCHLORIDE 500 MG/1
500 TABLET, COATED ORAL
Qty: 360 | Refills: 0 | Status: COMPLETED | COMMUNITY
Start: 2022-07-14

## 2022-11-02 RX ORDER — FOLIC ACID 1 MG/1
1 TABLET ORAL
Qty: 30 | Refills: 0 | Status: COMPLETED | COMMUNITY
Start: 2022-10-31

## 2022-11-02 RX ORDER — METHOTREXATE 2.5 MG/1
2.5 TABLET ORAL
Qty: 32 | Refills: 0 | Status: COMPLETED | COMMUNITY
Start: 2022-09-28

## 2022-11-02 RX ORDER — AMIODARONE HYDROCHLORIDE 200 MG/1
200 TABLET ORAL DAILY
Qty: 90 | Refills: 3 | Status: DISCONTINUED | COMMUNITY
Start: 2016-11-01 | End: 2022-11-02

## 2022-11-02 RX ORDER — PREDNISONE 2.5 MG/1
2.5 TABLET ORAL
Qty: 60 | Refills: 0 | Status: COMPLETED | COMMUNITY
Start: 2022-09-28

## 2022-11-07 ENCOUNTER — TRANSCRIPTION ENCOUNTER (OUTPATIENT)
Age: 82
End: 2022-11-07

## 2022-11-07 NOTE — ASU PATIENT PROFILE, ADULT - NSICDXPASTMEDICALHX_GEN_ALL_CORE_FT
PAST MEDICAL HISTORY:  Afib     Anemia     Anxiety and depression     Arthritis     COPD, mild     Hematuria     Hyperlipidemia     Hypertension     RACHEL (obstructive sleep apnea) mild as per pt- never used CPAP    Severe obesity (BMI >= 40)     Type 2 diabetes mellitus

## 2022-11-07 NOTE — ASU PATIENT PROFILE, ADULT - FALL HARM RISK - UNIVERSAL INTERVENTIONS
Bed in lowest position, wheels locked, appropriate side rails in place/Call bell, personal items and telephone in reach/Instruct patient to call for assistance before getting out of bed or chair/Non-slip footwear when patient is out of bed/[place] to call system/Physically safe environment - no spills, clutter or unnecessary equipment/Purposeful Proactive Rounding/Room/bathroom lighting operational, light cord in reach

## 2022-11-08 ENCOUNTER — TRANSCRIPTION ENCOUNTER (OUTPATIENT)
Age: 82
End: 2022-11-08

## 2022-11-08 ENCOUNTER — OUTPATIENT (OUTPATIENT)
Dept: OUTPATIENT SERVICES | Facility: HOSPITAL | Age: 82
LOS: 1 days | Discharge: HOME | End: 2022-11-08

## 2022-11-08 ENCOUNTER — RESULT REVIEW (OUTPATIENT)
Age: 82
End: 2022-11-08

## 2022-11-08 VITALS
WEIGHT: 244.93 LBS | SYSTOLIC BLOOD PRESSURE: 102 MMHG | RESPIRATION RATE: 16 BRPM | DIASTOLIC BLOOD PRESSURE: 53 MMHG | OXYGEN SATURATION: 99 % | TEMPERATURE: 97 F | HEART RATE: 84 BPM | HEIGHT: 66 IN

## 2022-11-08 VITALS — DIASTOLIC BLOOD PRESSURE: 62 MMHG | SYSTOLIC BLOOD PRESSURE: 120 MMHG

## 2022-11-08 DIAGNOSIS — Z90.49 ACQUIRED ABSENCE OF OTHER SPECIFIED PARTS OF DIGESTIVE TRACT: Chronic | ICD-10-CM

## 2022-11-08 DIAGNOSIS — R31.0 GROSS HEMATURIA: ICD-10-CM

## 2022-11-08 DIAGNOSIS — Z98.890 OTHER SPECIFIED POSTPROCEDURAL STATES: Chronic | ICD-10-CM

## 2022-11-08 RX ORDER — VORTIOXETINE 5 MG/1
1 TABLET, FILM COATED ORAL
Qty: 0 | Refills: 0 | DISCHARGE

## 2022-11-08 RX ORDER — ATORVASTATIN CALCIUM 80 MG/1
1 TABLET, FILM COATED ORAL
Qty: 0 | Refills: 0 | DISCHARGE

## 2022-11-08 RX ORDER — ASPIRIN/CALCIUM CARB/MAGNESIUM 324 MG
1 TABLET ORAL
Qty: 0 | Refills: 0 | DISCHARGE

## 2022-11-08 RX ORDER — ONDANSETRON 8 MG/1
4 TABLET, FILM COATED ORAL ONCE
Refills: 0 | Status: DISCONTINUED | OUTPATIENT
Start: 2022-11-08 | End: 2022-11-22

## 2022-11-08 RX ORDER — CEPHALEXIN 500 MG
1 CAPSULE ORAL
Qty: 6 | Refills: 0
Start: 2022-11-08 | End: 2022-11-10

## 2022-11-08 RX ORDER — METOPROLOL TARTRATE 50 MG
1 TABLET ORAL
Qty: 0 | Refills: 0 | DISCHARGE

## 2022-11-08 RX ORDER — METFORMIN HYDROCHLORIDE 850 MG/1
2000 TABLET ORAL
Qty: 0 | Refills: 0 | DISCHARGE

## 2022-11-08 RX ORDER — GLYCOPYRROLATE AND FORMOTEROL FUMARATE 9; 4.8 UG/1; UG/1
2 AEROSOL, METERED RESPIRATORY (INHALATION)
Qty: 0 | Refills: 0 | DISCHARGE

## 2022-11-08 RX ORDER — LOSARTAN/HYDROCHLOROTHIAZIDE 100MG-25MG
1 TABLET ORAL
Qty: 0 | Refills: 0 | DISCHARGE

## 2022-11-08 RX ORDER — SITAGLIPTIN 50 MG/1
1 TABLET, FILM COATED ORAL
Qty: 0 | Refills: 0 | DISCHARGE

## 2022-11-08 RX ORDER — OXYCODONE HYDROCHLORIDE 5 MG/1
5 TABLET ORAL ONCE
Refills: 0 | Status: DISCONTINUED | OUTPATIENT
Start: 2022-11-08 | End: 2022-11-08

## 2022-11-08 RX ORDER — METHOTREXATE 2.5 MG/1
8 TABLET ORAL
Qty: 0 | Refills: 0 | DISCHARGE

## 2022-11-08 RX ORDER — TIRZEPATIDE 15 MG/.5ML
1 INJECTION, SOLUTION SUBCUTANEOUS
Qty: 0 | Refills: 0 | DISCHARGE

## 2022-11-08 RX ORDER — FENTANYL CITRATE 50 UG/ML
25 INJECTION INTRAVENOUS
Refills: 0 | Status: DISCONTINUED | OUTPATIENT
Start: 2022-11-08 | End: 2022-11-08

## 2022-11-08 RX ORDER — AMIODARONE HYDROCHLORIDE 400 MG/1
1 TABLET ORAL
Qty: 0 | Refills: 0 | DISCHARGE

## 2022-11-08 RX ORDER — RIVAROXABAN 15 MG-20MG
1 KIT ORAL
Qty: 0 | Refills: 0 | DISCHARGE

## 2022-11-08 NOTE — BRIEF OPERATIVE NOTE - BRIEF OP NOTE DRAINS
Date of service: 



12/07/2018



PROCEDURE:  OB Pelvic Ultrasound



HISTORY:

abd pain in pregnancy



COMPARISON:

None available.



FINDINGS:



UTERUS:

Single intrauterine gestation.



Yolk sac and fetal pole are not identified on the current 

examination. 



Gestational sac diameter measures 0.51 cm too small to characterize 

gestational age. 



Fetal age (Ultrasound estimated): 



Date of delivery (Ultrasound estimated) : 



Fetal Heart rate:  bpm.



Radha-gestational hemorrhage: There is 2.0 x 0.5 x 1.9 cm 

perigestational anechoic area suspicious for subchorionic hemorrhage.







Measures 12.4 x 5.7 x 7.8 cm. Anteverted and enlarged.  There is a 

2.1 x 2.2 x 2.3 cm posterior wall subserosal calcified fibroid.  

There is a 1.1 x 1.0 x 1.1 cm posterior wall intramural fibroid in 

the midbody of the uterus. 



CERVIX:

Long and closed. No cervical abnormality seen.



RIGHT OVARY:

Measures 4.1 x 3.3 x 3.8 cm. No mass. Normal flow. There is a 3.3 x 

2.5 x 3.2 cm simple cyst.  There is a 1.9 x 1.9 x 2.0 cm 

complicated/hemorrhagic corpus luteum cyst.



LEFT OVARY:

Measures 3.1 x 3.0 x 2.8 cm. No mass. Normal flow. 



FREE FLUID:

There is small amount of free fluid in the right adnexa. 



OTHER FINDINGS:

None. 



IMPRESSION:

Single intrauterine gestational sac too small to characterize 

gestational age.  Fetal pole and yolk sac are not identified on the 

current examination.  Clinical and ultrasound follow-up is 

recommended to assess viability.



Suspect 2.0 x 0.5 x 1.9 cm subchorionic hemorrhage.



3.3 cm simple cyst in the right ovary. 



Fibroid uterus. 



A preliminary report was provided by [facility].



A preliminary report was provided by [facility]. 20 fr

## 2022-11-08 NOTE — ASU DISCHARGE PLAN (ADULT/PEDIATRIC) - NURSING INSTRUCTIONS
DO NOT take any Tylenol (Acetaminophen) or narcotics containing Tylenol until after  ____6:50 pm__ . You received Tylenol during your operation and it can cause damage to your liver if too much is taken within a 24 hour time period.  Follow up with MD. If pain not being relieved with medication, lennon catheter draining bright red blood or lennon not draining notify MD. DO NOT take any Tylenol (Acetaminophen) or narcotics containing Tylenol until after  ____6:50 pm__ . You received Tylenol during your operation and it can cause damage to your liver if too much is taken within a 24 hour time period.  Follow up with MD. If pain not being relieved with medication, lennon catheter draining bright red blood or lennon not draining notify MD. Wife and pt given both verbal and teach back instructions on care of lennon.

## 2022-11-08 NOTE — ASU PREOP CHECKLIST - AS TEMP SITE
oral Meropenem 1g IV Q8H & Vancomycin 1 g IV Q12H  f/u urine culture Meropenem 1g IV Q8H & Vancomycin 1 g IV Q12H  f/u urine culture  -seen by urology and to c/w jones, will attempt for a change in jones   -florastor added

## 2022-11-08 NOTE — ASU DISCHARGE PLAN (ADULT/PEDIATRIC) - CARE PROVIDER_API CALL
Ashu Wagoner)  Urology  [address], [address]  [place] [address]  Phone: [phone]  Fax: [phone]  Follow Up Time: 1-3 days

## 2022-11-08 NOTE — ASU DISCHARGE PLAN (ADULT/PEDIATRIC) - NS MD DC FALL RISK RISK
For information on Fall & Injury Prevention, visit: [email or web address] OR  [email or web address] OR  https://www.cdc.gov/steadi/patient.html

## 2022-11-08 NOTE — ASU DISCHARGE PLAN (ADULT/PEDIATRIC) - ASU DC SPECIAL INSTRUCTIONSFT
CATHETER: Some patients are sent home with a lennon catheter, while others go home urinating on their own. If you still have a catheter, the nurses will review instructions and care before you go home. For men, you may have a prescription for lidocaine jelly to apply to the tip of your penis, as needed, for catheter related discomfort.  GENERAL: It is common to have blood in your urine after your procedure. It may be pink or even red; inform your doctor if you have a significant amount of clot in the urine or if you are unable to void at all or if your catheter stops draining. The urine may clear and then become bloody again especially as you are more physically active. It is not uncommon to have some burning when you urinate, this will gradually improve. With a catheter in place, it is not uncommon to have occasional leakage or urine or blood around the catheter. Please call your urologist if this is excessive and/or the urine is not draining through the catheter into the bag.  BATHING: You may shower or bathe. If going home with lennon, shower only until catheter is removed.  DIET: You may resume your regular diet and regular medication regimen.  PAIN: You may take Tylenol (acetaminophen) 650-975mg and/or Motrin (ibuprofen) 400-600mg, both available over the counter, for pain every 6 hours as needed. Do not exceed 4000mg of Tylenol (acetaminophen) daily. You may alternate these medications such that you take one or the other every 3 hours for around the clock pain coverage.  ANTIBIOTICS: You may be given a prescription for an antibiotic, please take this medication as instructed and be sure to complete the entire course. 3 DAYS KEFLEX   STOOL SOFTENERS: Do not allow yourself to become constipated as straining may cause bleeding. Take stool softeners or a laxative (ex. Miralax, Colace, Senokot, ExLax, etc), available over the counter, if needed.  ACTIVITY: No heavy lifting or strenuous exercise until you are evaluated at your post-operative appointment. Otherwise, you may return to your usual level of physical activity.  ANTICOAGULATION: If you are taking any blood thinning medications, please discuss with your urologist prior to restarting these medications unless otherwise specified. HOLD XARELTO UNTIL AFTER POST OP APPOINTMENT THURSDAY   FOLLOW-UP: If you did not already schedule your post-operative appointment, please call your urologist to schedule and follow-up appointment. FOLLOW UP THURSDAY FOR TOV   CALL YOUR UROLOGIST IF: You have any bleeding that does not stop, inability to void >8 hours, fever over 100.4 F, chills, persistent nausea/vomiting, changes in your incision concerning for infection, or if your pain is not controlled on your discharge pain medications.

## 2022-11-10 LAB — GLUCOSE BLDC GLUCOMTR-MCNC: 141 MG/DL — HIGH (ref 70–99)

## 2022-11-15 LAB — SURGICAL PATHOLOGY STUDY: SIGNIFICANT CHANGE UP

## 2022-12-06 ENCOUNTER — APPOINTMENT (OUTPATIENT)
Dept: CARDIOLOGY | Facility: CLINIC | Age: 82
End: 2022-12-06

## 2022-12-06 ENCOUNTER — NON-APPOINTMENT (OUTPATIENT)
Age: 82
End: 2022-12-06

## 2022-12-06 ENCOUNTER — LABORATORY RESULT (OUTPATIENT)
Age: 82
End: 2022-12-06

## 2022-12-06 VITALS — WEIGHT: 238 LBS | BODY MASS INDEX: 37.28 KG/M2 | DIASTOLIC BLOOD PRESSURE: 100 MMHG | SYSTOLIC BLOOD PRESSURE: 150 MMHG

## 2022-12-06 DIAGNOSIS — R06.02 SHORTNESS OF BREATH: ICD-10-CM

## 2022-12-06 LAB
ANION GAP SERPL CALC-SCNC: 13 MMOL/L
BUN SERPL-MCNC: 19 MG/DL
CALCIUM SERPL-MCNC: 8.9 MG/DL
CHLORIDE SERPL-SCNC: 103 MMOL/L
CO2 SERPL-SCNC: 21 MMOL/L
CREAT SERPL-MCNC: 1.5 MG/DL
EGFR: 47 ML/MIN/1.73M2
FOLATE SERPL-MCNC: >20 NG/ML
GLUCOSE SERPL-MCNC: 221 MG/DL
IRON SATN MFR SERPL: 12 %
IRON SERPL-MCNC: 31 UG/DL
POTASSIUM SERPL-SCNC: 5 MMOL/L
SODIUM SERPL-SCNC: 137 MMOL/L
TIBC SERPL-MCNC: 256 UG/DL
UIBC SERPL-MCNC: 225 UG/DL
VIT B12 SERPL-MCNC: 1061 PG/ML

## 2022-12-06 PROCEDURE — 99215 OFFICE O/P EST HI 40 MIN: CPT

## 2022-12-06 PROCEDURE — 36415 COLL VENOUS BLD VENIPUNCTURE: CPT

## 2022-12-06 PROCEDURE — 93000 ELECTROCARDIOGRAM COMPLETE: CPT

## 2022-12-06 RX ORDER — SPIRONOLACTONE 25 MG/1
25 TABLET ORAL
Qty: 15 | Refills: 3 | Status: ACTIVE | COMMUNITY
Start: 2022-12-06 | End: 1900-01-01

## 2022-12-06 RX ORDER — FERROUS GLUCONATE 324(37.5)
324 (37.5 FE) TABLET ORAL TWICE DAILY
Qty: 90 | Refills: 0 | Status: ACTIVE | COMMUNITY
Start: 2022-12-06

## 2022-12-06 RX ORDER — METOLAZONE 5 MG/1
5 TABLET ORAL DAILY
Qty: 30 | Refills: 6 | Status: ACTIVE | COMMUNITY
Start: 2022-12-06 | End: 1900-01-01

## 2022-12-07 LAB
BASOPHILS # BLD AUTO: 0.03 K/UL
BASOPHILS NFR BLD AUTO: 0.9 %
EOSINOPHIL # BLD AUTO: 0.06 K/UL
EOSINOPHIL NFR BLD AUTO: 1.7 %
HCT VFR BLD CALC: 33.1 %
HGB BLD-MCNC: 9.9 G/DL
LYMPHOCYTES # BLD AUTO: 0.25 K/UL
LYMPHOCYTES NFR BLD AUTO: 7 %
MAN DIFF?: NORMAL
MCHC RBC-ENTMCNC: 28.9 PG
MCHC RBC-ENTMCNC: 29.9 GM/DL
MCV RBC AUTO: 96.5 FL
MONOCYTES # BLD AUTO: 0.16 K/UL
MONOCYTES NFR BLD AUTO: 4.4 %
NEUTROPHILS # BLD AUTO: 2.99 K/UL
NEUTROPHILS NFR BLD AUTO: 84.2 %
PLATELET # BLD AUTO: 398 K/UL
RBC # BLD: 3.43 M/UL
RBC # FLD: 22.5 %
WBC # FLD AUTO: 3.55 K/UL

## 2023-01-12 ENCOUNTER — APPOINTMENT (OUTPATIENT)
Dept: CARDIOLOGY | Facility: CLINIC | Age: 83
End: 2023-01-12
Payer: MEDICARE

## 2023-01-12 ENCOUNTER — NON-APPOINTMENT (OUTPATIENT)
Age: 83
End: 2023-01-12

## 2023-01-12 ENCOUNTER — RX RENEWAL (OUTPATIENT)
Age: 83
End: 2023-01-12

## 2023-01-12 VITALS
DIASTOLIC BLOOD PRESSURE: 46 MMHG | OXYGEN SATURATION: 85 % | WEIGHT: 209 LBS | HEART RATE: 166 BPM | SYSTOLIC BLOOD PRESSURE: 84 MMHG | BODY MASS INDEX: 32.73 KG/M2

## 2023-01-12 DIAGNOSIS — I95.9 HYPOTENSION, UNSPECIFIED: ICD-10-CM

## 2023-01-12 LAB
ALBUMIN SERPL ELPH-MCNC: 3.6 G/DL
ALP BLD-CCNC: 95 U/L
ALT SERPL-CCNC: 13 U/L
ANION GAP SERPL CALC-SCNC: 15 MMOL/L
AST SERPL-CCNC: 21 U/L
BASOPHILS # BLD AUTO: 0.04 K/UL
BASOPHILS NFR BLD AUTO: 0.5 %
BILIRUB SERPL-MCNC: 0.6 MG/DL
BUN SERPL-MCNC: 16 MG/DL
CALCIUM SERPL-MCNC: 9 MG/DL
CHLORIDE SERPL-SCNC: 102 MMOL/L
CO2 SERPL-SCNC: 21 MMOL/L
CREAT SERPL-MCNC: 1.37 MG/DL
EGFR: 52 ML/MIN/1.73M2
EOSINOPHIL # BLD AUTO: 0.07 K/UL
EOSINOPHIL NFR BLD AUTO: 0.8 %
GLUCOSE SERPL-MCNC: 71 MG/DL
HCT VFR BLD CALC: 37.5 %
HGB BLD-MCNC: 11.3 G/DL
IMM GRANULOCYTES NFR BLD AUTO: 0.5 %
LYMPHOCYTES # BLD AUTO: 1.05 K/UL
LYMPHOCYTES NFR BLD AUTO: 12.6 %
MAN DIFF?: NORMAL
MCHC RBC-ENTMCNC: 28.5 PG
MCHC RBC-ENTMCNC: 30.1 GM/DL
MCV RBC AUTO: 94.5 FL
MONOCYTES # BLD AUTO: 0.32 K/UL
MONOCYTES NFR BLD AUTO: 3.9 %
NEUTROPHILS # BLD AUTO: 6.79 K/UL
NEUTROPHILS NFR BLD AUTO: 81.7 %
PLATELET # BLD AUTO: 359 K/UL
POTASSIUM SERPL-SCNC: 4.6 MMOL/L
PROT SERPL-MCNC: 6.2 G/DL
RBC # BLD: 3.97 M/UL
RBC # FLD: 17.3 %
SODIUM SERPL-SCNC: 138 MMOL/L
WBC # FLD AUTO: 8.31 K/UL

## 2023-01-12 PROCEDURE — 93000 ELECTROCARDIOGRAM COMPLETE: CPT

## 2023-01-12 PROCEDURE — 36415 COLL VENOUS BLD VENIPUNCTURE: CPT

## 2023-01-12 PROCEDURE — 99215 OFFICE O/P EST HI 40 MIN: CPT

## 2023-01-12 RX ORDER — LOSARTAN POTASSIUM AND HYDROCHLOROTHIAZIDE 25; 100 MG/1; MG/1
100-25 TABLET ORAL
Qty: 90 | Refills: 3 | Status: ACTIVE | COMMUNITY
Start: 2017-04-06 | End: 1900-01-01

## 2023-01-18 ENCOUNTER — APPOINTMENT (OUTPATIENT)
Dept: CARDIOLOGY | Facility: CLINIC | Age: 83
End: 2023-01-18
Payer: MEDICARE

## 2023-01-18 ENCOUNTER — NON-APPOINTMENT (OUTPATIENT)
Age: 83
End: 2023-01-18

## 2023-01-18 VITALS
WEIGHT: 210 LBS | BODY MASS INDEX: 32.89 KG/M2 | HEART RATE: 85 BPM | SYSTOLIC BLOOD PRESSURE: 138 MMHG | DIASTOLIC BLOOD PRESSURE: 86 MMHG | OXYGEN SATURATION: 100 %

## 2023-01-18 DIAGNOSIS — R79.89 OTHER SPECIFIED ABNORMAL FINDINGS OF BLOOD CHEMISTRY: ICD-10-CM

## 2023-01-18 DIAGNOSIS — I50.33 ACUTE ON CHRONIC DIASTOLIC (CONGESTIVE) HEART FAILURE: ICD-10-CM

## 2023-01-18 LAB
ANION GAP SERPL CALC-SCNC: 13 MMOL/L
BASOPHILS # BLD AUTO: 0.03 K/UL
BASOPHILS NFR BLD AUTO: 0.4 %
BUN SERPL-MCNC: 14 MG/DL
CALCIUM SERPL-MCNC: 9.5 MG/DL
CHLORIDE SERPL-SCNC: 103 MMOL/L
CO2 SERPL-SCNC: 24 MMOL/L
CREAT SERPL-MCNC: 1.25 MG/DL
EGFR: 58 ML/MIN/1.73M2
EOSINOPHIL # BLD AUTO: 0.1 K/UL
EOSINOPHIL NFR BLD AUTO: 1.3 %
GLUCOSE SERPL-MCNC: 69 MG/DL
HCT VFR BLD CALC: 37.1 %
HGB BLD-MCNC: 11.3 G/DL
IMM GRANULOCYTES NFR BLD AUTO: 0.5 %
LYMPHOCYTES # BLD AUTO: 0.93 K/UL
LYMPHOCYTES NFR BLD AUTO: 12.6 %
MAN DIFF?: NORMAL
MCHC RBC-ENTMCNC: 29 PG
MCHC RBC-ENTMCNC: 30.5 GM/DL
MCV RBC AUTO: 95.1 FL
MONOCYTES # BLD AUTO: 0.27 K/UL
MONOCYTES NFR BLD AUTO: 3.6 %
NEUTROPHILS # BLD AUTO: 6.04 K/UL
NEUTROPHILS NFR BLD AUTO: 81.6 %
PLATELET # BLD AUTO: 292 K/UL
POTASSIUM SERPL-SCNC: 4.7 MMOL/L
RBC # BLD: 3.9 M/UL
RBC # FLD: 17.2 %
SODIUM SERPL-SCNC: 139 MMOL/L
WBC # FLD AUTO: 7.41 K/UL

## 2023-01-18 PROCEDURE — 36415 COLL VENOUS BLD VENIPUNCTURE: CPT

## 2023-01-18 PROCEDURE — 99215 OFFICE O/P EST HI 40 MIN: CPT

## 2023-01-18 PROCEDURE — 93000 ELECTROCARDIOGRAM COMPLETE: CPT

## 2023-01-27 LAB — DIGOXIN SERPL-MCNC: 1.3 NG/ML

## 2023-01-29 ENCOUNTER — RX RENEWAL (OUTPATIENT)
Age: 83
End: 2023-01-29

## 2023-04-10 DIAGNOSIS — H57.89 OTHER SPECIFIED DISORDERS OF EYE AND ADNEXA: ICD-10-CM

## 2023-04-30 ENCOUNTER — RX RENEWAL (OUTPATIENT)
Age: 83
End: 2023-04-30

## 2023-05-04 ENCOUNTER — APPOINTMENT (OUTPATIENT)
Dept: VASCULAR SURGERY | Facility: CLINIC | Age: 83
End: 2023-05-04
Payer: MEDICARE

## 2023-05-04 VITALS
BODY MASS INDEX: 33.59 KG/M2 | DIASTOLIC BLOOD PRESSURE: 74 MMHG | WEIGHT: 209 LBS | SYSTOLIC BLOOD PRESSURE: 120 MMHG | HEIGHT: 66 IN | TEMPERATURE: 98.2 F | HEART RATE: 55 BPM

## 2023-05-04 DIAGNOSIS — I65.23 OCCLUSION AND STENOSIS OF BILATERAL CAROTID ARTERIES: ICD-10-CM

## 2023-05-04 PROCEDURE — 93880 EXTRACRANIAL BILAT STUDY: CPT

## 2023-05-04 PROCEDURE — 99204 OFFICE O/P NEW MOD 45 MIN: CPT

## 2023-05-24 ENCOUNTER — NON-APPOINTMENT (OUTPATIENT)
Age: 83
End: 2023-05-24

## 2023-05-24 ENCOUNTER — APPOINTMENT (OUTPATIENT)
Dept: OPHTHALMOLOGY | Facility: CLINIC | Age: 83
End: 2023-05-24
Payer: MEDICARE

## 2023-05-24 PROCEDURE — 92133 CPTRZD OPH DX IMG PST SGM ON: CPT

## 2023-05-24 PROCEDURE — 99204 OFFICE O/P NEW MOD 45 MIN: CPT

## 2023-05-24 PROCEDURE — 92083 EXTENDED VISUAL FIELD XM: CPT

## 2023-08-10 ENCOUNTER — RX RENEWAL (OUTPATIENT)
Age: 83
End: 2023-08-10

## 2023-09-21 ENCOUNTER — RX RENEWAL (OUTPATIENT)
Age: 83
End: 2023-09-21

## 2023-10-01 ENCOUNTER — RX RENEWAL (OUTPATIENT)
Age: 83
End: 2023-10-01

## 2023-10-01 RX ORDER — FUROSEMIDE 40 MG/1
40 TABLET ORAL DAILY
Qty: 135 | Refills: 3 | Status: ACTIVE | COMMUNITY
Start: 2020-07-28 | End: 1900-01-01

## 2023-10-02 ENCOUNTER — APPOINTMENT (OUTPATIENT)
Dept: CARDIOLOGY | Facility: CLINIC | Age: 83
End: 2023-10-02
Payer: MEDICARE

## 2023-10-02 VITALS
HEART RATE: 101 BPM | OXYGEN SATURATION: 98 % | BODY MASS INDEX: 32.77 KG/M2 | WEIGHT: 203 LBS | DIASTOLIC BLOOD PRESSURE: 70 MMHG | SYSTOLIC BLOOD PRESSURE: 120 MMHG

## 2023-10-02 DIAGNOSIS — E78.00 PURE HYPERCHOLESTEROLEMIA, UNSPECIFIED: ICD-10-CM

## 2023-10-02 DIAGNOSIS — E66.9 OBESITY, UNSPECIFIED: ICD-10-CM

## 2023-10-02 DIAGNOSIS — I45.10 UNSPECIFIED RIGHT BUNDLE-BRANCH BLOCK: ICD-10-CM

## 2023-10-02 PROCEDURE — 93000 ELECTROCARDIOGRAM COMPLETE: CPT

## 2023-10-02 PROCEDURE — 99215 OFFICE O/P EST HI 40 MIN: CPT

## 2023-10-02 RX ORDER — GLIPIZIDE 5 MG/1
5 TABLET ORAL
Qty: 90 | Refills: 3 | Status: ACTIVE | COMMUNITY
Start: 2017-04-06

## 2023-10-23 ENCOUNTER — NON-APPOINTMENT (OUTPATIENT)
Age: 83
End: 2023-10-23

## 2023-10-30 ENCOUNTER — RX RENEWAL (OUTPATIENT)
Age: 83
End: 2023-10-30

## 2023-10-30 RX ORDER — RIVAROXABAN 20 MG/1
20 TABLET, FILM COATED ORAL
Qty: 90 | Refills: 3 | Status: ACTIVE | COMMUNITY
Start: 2017-06-12 | End: 1900-01-01

## 2023-10-31 ENCOUNTER — APPOINTMENT (OUTPATIENT)
Dept: OPHTHALMOLOGY | Facility: CLINIC | Age: 83
End: 2023-10-31
Payer: MEDICARE

## 2023-10-31 PROCEDURE — 99214 OFFICE O/P EST MOD 30 MIN: CPT

## 2023-10-31 PROCEDURE — 92083 EXTENDED VISUAL FIELD XM: CPT

## 2023-10-31 PROCEDURE — 92133 CPTRZD OPH DX IMG PST SGM ON: CPT

## 2023-11-15 ENCOUNTER — NON-APPOINTMENT (OUTPATIENT)
Age: 83
End: 2023-11-15

## 2023-11-16 ENCOUNTER — RX RENEWAL (OUTPATIENT)
Age: 83
End: 2023-11-16

## 2024-01-03 ENCOUNTER — RX RENEWAL (OUTPATIENT)
Age: 84
End: 2024-01-03

## 2024-02-19 ENCOUNTER — NON-APPOINTMENT (OUTPATIENT)
Age: 84
End: 2024-02-19

## 2024-02-22 RX ORDER — ATORVASTATIN CALCIUM 20 MG/1
20 TABLET, FILM COATED ORAL
Qty: 90 | Refills: 3 | Status: ACTIVE | COMMUNITY
Start: 2024-02-20 | End: 1900-01-01

## 2024-03-20 ENCOUNTER — NON-APPOINTMENT (OUTPATIENT)
Age: 84
End: 2024-03-20

## 2024-03-26 ENCOUNTER — INPATIENT (INPATIENT)
Facility: HOSPITAL | Age: 84
LOS: 1 days | Discharge: HOME HEALTH | DRG: 291 | End: 2024-03-28
Attending: INTERNAL MEDICINE | Admitting: INTERNAL MEDICINE
Payer: MEDICARE

## 2024-03-26 VITALS
DIASTOLIC BLOOD PRESSURE: 65 MMHG | TEMPERATURE: 98 F | OXYGEN SATURATION: 100 % | WEIGHT: 160.06 LBS | HEART RATE: 107 BPM | RESPIRATION RATE: 18 BRPM | SYSTOLIC BLOOD PRESSURE: 107 MMHG | HEIGHT: 67 IN

## 2024-03-26 DIAGNOSIS — I48.91 UNSPECIFIED ATRIAL FIBRILLATION: ICD-10-CM

## 2024-03-26 DIAGNOSIS — Z98.890 OTHER SPECIFIED POSTPROCEDURAL STATES: Chronic | ICD-10-CM

## 2024-03-26 DIAGNOSIS — Z90.49 ACQUIRED ABSENCE OF OTHER SPECIFIED PARTS OF DIGESTIVE TRACT: Chronic | ICD-10-CM

## 2024-03-26 LAB
ALBUMIN SERPL ELPH-MCNC: 3.5 G/DL — SIGNIFICANT CHANGE UP (ref 3.3–5)
ALP SERPL-CCNC: 95 U/L — SIGNIFICANT CHANGE UP (ref 40–120)
ALT FLD-CCNC: 21 U/L — SIGNIFICANT CHANGE UP (ref 10–45)
ANION GAP SERPL CALC-SCNC: 14 MMOL/L — SIGNIFICANT CHANGE UP (ref 5–17)
APPEARANCE UR: CLEAR — SIGNIFICANT CHANGE UP
APTT BLD: 48.8 SEC — HIGH (ref 24.5–35.6)
AST SERPL-CCNC: 18 U/L — SIGNIFICANT CHANGE UP (ref 10–40)
BACTERIA # UR AUTO: NEGATIVE /HPF — SIGNIFICANT CHANGE UP
BASE EXCESS BLDV CALC-SCNC: -1.8 MMOL/L — SIGNIFICANT CHANGE UP (ref -2–3)
BASOPHILS # BLD AUTO: 0.05 K/UL — SIGNIFICANT CHANGE UP (ref 0–0.2)
BASOPHILS NFR BLD AUTO: 0.6 % — SIGNIFICANT CHANGE UP (ref 0–2)
BILIRUB SERPL-MCNC: 0.6 MG/DL — SIGNIFICANT CHANGE UP (ref 0.2–1.2)
BILIRUB UR-MCNC: NEGATIVE — SIGNIFICANT CHANGE UP
BUN SERPL-MCNC: 21 MG/DL — SIGNIFICANT CHANGE UP (ref 7–23)
CA-I SERPL-SCNC: 1.28 MMOL/L — SIGNIFICANT CHANGE UP (ref 1.15–1.33)
CALCIUM SERPL-MCNC: 9.5 MG/DL — SIGNIFICANT CHANGE UP (ref 8.4–10.5)
CAST: 2 /LPF — SIGNIFICANT CHANGE UP (ref 0–4)
CHLORIDE BLDV-SCNC: 108 MMOL/L — SIGNIFICANT CHANGE UP (ref 96–108)
CHLORIDE SERPL-SCNC: 108 MMOL/L — SIGNIFICANT CHANGE UP (ref 96–108)
CO2 BLDV-SCNC: 27 MMOL/L — HIGH (ref 22–26)
CO2 SERPL-SCNC: 20 MMOL/L — LOW (ref 22–31)
COLOR SPEC: SIGNIFICANT CHANGE UP
CREAT SERPL-MCNC: 0.92 MG/DL — SIGNIFICANT CHANGE UP (ref 0.5–1.3)
DIFF PNL FLD: NEGATIVE — SIGNIFICANT CHANGE UP
DIGOXIN SERPL-MCNC: 1.3 NG/ML — SIGNIFICANT CHANGE UP (ref 0.8–2)
EGFR: 84 ML/MIN/1.73M2 — SIGNIFICANT CHANGE UP
EOSINOPHIL # BLD AUTO: 0.17 K/UL — SIGNIFICANT CHANGE UP (ref 0–0.5)
EOSINOPHIL NFR BLD AUTO: 1.9 % — SIGNIFICANT CHANGE UP (ref 0–6)
ETHANOL SERPL-MCNC: <10 MG/DL — SIGNIFICANT CHANGE UP (ref 0–10)
GAS PNL BLDV: 138 MMOL/L — SIGNIFICANT CHANGE UP (ref 136–145)
GAS PNL BLDV: SIGNIFICANT CHANGE UP
GLUCOSE BLDV-MCNC: 121 MG/DL — HIGH (ref 70–99)
GLUCOSE SERPL-MCNC: 158 MG/DL — HIGH (ref 70–99)
GLUCOSE UR QL: NEGATIVE MG/DL — SIGNIFICANT CHANGE UP
HCO3 BLDV-SCNC: 25 MMOL/L — SIGNIFICANT CHANGE UP (ref 22–29)
HCT VFR BLD CALC: 37.1 % — LOW (ref 39–50)
HCT VFR BLDA CALC: 32 % — LOW (ref 39–51)
HGB BLD CALC-MCNC: 10.8 G/DL — LOW (ref 12.6–17.4)
HGB BLD-MCNC: 11 G/DL — LOW (ref 13–17)
IMM GRANULOCYTES NFR BLD AUTO: 0.8 % — SIGNIFICANT CHANGE UP (ref 0–0.9)
INR BLD: 3.71 RATIO — HIGH (ref 0.85–1.18)
KETONES UR-MCNC: ABNORMAL MG/DL
LACTATE BLDV-MCNC: 2.1 MMOL/L — HIGH (ref 0.5–2)
LEUKOCYTE ESTERASE UR-ACNC: NEGATIVE — SIGNIFICANT CHANGE UP
LIDOCAIN IGE QN: 35 U/L — SIGNIFICANT CHANGE UP (ref 7–60)
LYMPHOCYTES # BLD AUTO: 0.79 K/UL — LOW (ref 1–3.3)
LYMPHOCYTES # BLD AUTO: 8.8 % — LOW (ref 13–44)
MAGNESIUM SERPL-MCNC: 1.6 MG/DL — SIGNIFICANT CHANGE UP (ref 1.6–2.6)
MCHC RBC-ENTMCNC: 28.2 PG — SIGNIFICANT CHANGE UP (ref 27–34)
MCHC RBC-ENTMCNC: 29.6 GM/DL — LOW (ref 32–36)
MCV RBC AUTO: 95.1 FL — SIGNIFICANT CHANGE UP (ref 80–100)
MONOCYTES # BLD AUTO: 0.39 K/UL — SIGNIFICANT CHANGE UP (ref 0–0.9)
MONOCYTES NFR BLD AUTO: 4.4 % — SIGNIFICANT CHANGE UP (ref 2–14)
NEUTROPHILS # BLD AUTO: 7.49 K/UL — HIGH (ref 1.8–7.4)
NEUTROPHILS NFR BLD AUTO: 83.5 % — HIGH (ref 43–77)
NITRITE UR-MCNC: NEGATIVE — SIGNIFICANT CHANGE UP
NRBC # BLD: 0 /100 WBCS — SIGNIFICANT CHANGE UP (ref 0–0)
NT-PROBNP SERPL-SCNC: 5540 PG/ML — HIGH (ref 0–300)
PCO2 BLDV: 54 MMHG — SIGNIFICANT CHANGE UP (ref 42–55)
PH BLDV: 7.28 — LOW (ref 7.32–7.43)
PH UR: 5 — SIGNIFICANT CHANGE UP (ref 5–8)
PHOSPHATE SERPL-MCNC: 2.8 MG/DL — SIGNIFICANT CHANGE UP (ref 2.5–4.5)
PLATELET # BLD AUTO: 282 K/UL — SIGNIFICANT CHANGE UP (ref 150–400)
PO2 BLDV: 17 MMHG — LOW (ref 25–45)
POTASSIUM BLDV-SCNC: 4 MMOL/L — SIGNIFICANT CHANGE UP (ref 3.5–5.1)
POTASSIUM SERPL-MCNC: 4.5 MMOL/L — SIGNIFICANT CHANGE UP (ref 3.5–5.3)
POTASSIUM SERPL-SCNC: 4.5 MMOL/L — SIGNIFICANT CHANGE UP (ref 3.5–5.3)
PROT SERPL-MCNC: 6.7 G/DL — SIGNIFICANT CHANGE UP (ref 6–8.3)
PROT UR-MCNC: 30 MG/DL
PROTHROM AB SERPL-ACNC: 37.5 SEC — HIGH (ref 9.5–13)
RBC # BLD: 3.9 M/UL — LOW (ref 4.2–5.8)
RBC # FLD: 18.5 % — HIGH (ref 10.3–14.5)
RBC CASTS # UR COMP ASSIST: 2 /HPF — SIGNIFICANT CHANGE UP (ref 0–4)
SAO2 % BLDV: 22.6 % — LOW (ref 67–88)
SODIUM SERPL-SCNC: 142 MMOL/L — SIGNIFICANT CHANGE UP (ref 135–145)
SP GR SPEC: 1.03 — SIGNIFICANT CHANGE UP (ref 1–1.03)
SQUAMOUS # UR AUTO: 1 /HPF — SIGNIFICANT CHANGE UP (ref 0–5)
TROPONIN T, HIGH SENSITIVITY RESULT: 64 NG/L — HIGH (ref 0–51)
TROPONIN T, HIGH SENSITIVITY RESULT: 65 NG/L — HIGH (ref 0–51)
UROBILINOGEN FLD QL: 0.2 MG/DL — SIGNIFICANT CHANGE UP (ref 0.2–1)
WBC # BLD: 8.96 K/UL — SIGNIFICANT CHANGE UP (ref 3.8–10.5)
WBC # FLD AUTO: 8.96 K/UL — SIGNIFICANT CHANGE UP (ref 3.8–10.5)
WBC UR QL: 1 /HPF — SIGNIFICANT CHANGE UP (ref 0–5)

## 2024-03-26 PROCEDURE — 71045 X-RAY EXAM CHEST 1 VIEW: CPT | Mod: 26

## 2024-03-26 PROCEDURE — 99223 1ST HOSP IP/OBS HIGH 75: CPT

## 2024-03-26 PROCEDURE — 74177 CT ABD & PELVIS W/CONTRAST: CPT | Mod: 26,MC

## 2024-03-26 PROCEDURE — 72170 X-RAY EXAM OF PELVIS: CPT | Mod: 26

## 2024-03-26 PROCEDURE — 71275 CT ANGIOGRAPHY CHEST: CPT | Mod: 26,MC

## 2024-03-26 PROCEDURE — 72125 CT NECK SPINE W/O DYE: CPT | Mod: 26,MC

## 2024-03-26 PROCEDURE — 73630 X-RAY EXAM OF FOOT: CPT | Mod: 26,RT

## 2024-03-26 PROCEDURE — 99285 EMERGENCY DEPT VISIT HI MDM: CPT | Mod: FS

## 2024-03-26 PROCEDURE — 70450 CT HEAD/BRAIN W/O DYE: CPT | Mod: 26,MC

## 2024-03-26 RX ORDER — SODIUM CHLORIDE 9 MG/ML
250 INJECTION INTRAMUSCULAR; INTRAVENOUS; SUBCUTANEOUS ONCE
Refills: 0 | Status: COMPLETED | OUTPATIENT
Start: 2024-03-26 | End: 2024-03-26

## 2024-03-26 RX ORDER — DEXTROSE 50 % IN WATER 50 %
12.5 SYRINGE (ML) INTRAVENOUS ONCE
Refills: 0 | Status: DISCONTINUED | OUTPATIENT
Start: 2024-03-26 | End: 2024-03-28

## 2024-03-26 RX ORDER — VANCOMYCIN HCL 1 G
1000 VIAL (EA) INTRAVENOUS ONCE
Refills: 0 | Status: DISCONTINUED | OUTPATIENT
Start: 2024-03-26 | End: 2024-03-26

## 2024-03-26 RX ORDER — METOPROLOL TARTRATE 50 MG
25 TABLET ORAL ONCE
Refills: 0 | Status: COMPLETED | OUTPATIENT
Start: 2024-03-26 | End: 2024-03-26

## 2024-03-26 RX ORDER — ONDANSETRON 8 MG/1
4 TABLET, FILM COATED ORAL EVERY 8 HOURS
Refills: 0 | Status: DISCONTINUED | OUTPATIENT
Start: 2024-03-26 | End: 2024-03-28

## 2024-03-26 RX ORDER — FUROSEMIDE 40 MG
40 TABLET ORAL DAILY
Refills: 0 | Status: DISCONTINUED | OUTPATIENT
Start: 2024-03-26 | End: 2024-03-28

## 2024-03-26 RX ORDER — SODIUM CHLORIDE 9 MG/ML
1000 INJECTION, SOLUTION INTRAVENOUS
Refills: 0 | Status: DISCONTINUED | OUTPATIENT
Start: 2024-03-26 | End: 2024-03-28

## 2024-03-26 RX ORDER — INSULIN LISPRO 100/ML
VIAL (ML) SUBCUTANEOUS
Refills: 0 | Status: DISCONTINUED | OUTPATIENT
Start: 2024-03-26 | End: 2024-03-28

## 2024-03-26 RX ORDER — GLUCAGON INJECTION, SOLUTION 0.5 MG/.1ML
1 INJECTION, SOLUTION SUBCUTANEOUS ONCE
Refills: 0 | Status: DISCONTINUED | OUTPATIENT
Start: 2024-03-26 | End: 2024-03-28

## 2024-03-26 RX ORDER — FUROSEMIDE 40 MG
40 TABLET ORAL ONCE
Refills: 0 | Status: DISCONTINUED | OUTPATIENT
Start: 2024-03-26 | End: 2024-03-26

## 2024-03-26 RX ORDER — LANOLIN ALCOHOL/MO/W.PET/CERES
3 CREAM (GRAM) TOPICAL AT BEDTIME
Refills: 0 | Status: DISCONTINUED | OUTPATIENT
Start: 2024-03-26 | End: 2024-03-28

## 2024-03-26 RX ORDER — DEXTROSE 50 % IN WATER 50 %
25 SYRINGE (ML) INTRAVENOUS ONCE
Refills: 0 | Status: DISCONTINUED | OUTPATIENT
Start: 2024-03-26 | End: 2024-03-28

## 2024-03-26 RX ORDER — INSULIN LISPRO 100/ML
VIAL (ML) SUBCUTANEOUS AT BEDTIME
Refills: 0 | Status: DISCONTINUED | OUTPATIENT
Start: 2024-03-26 | End: 2024-03-28

## 2024-03-26 RX ORDER — DEXTROSE 50 % IN WATER 50 %
15 SYRINGE (ML) INTRAVENOUS ONCE
Refills: 0 | Status: DISCONTINUED | OUTPATIENT
Start: 2024-03-26 | End: 2024-03-28

## 2024-03-26 RX ORDER — METOPROLOL TARTRATE 50 MG
5 TABLET ORAL ONCE
Refills: 0 | Status: COMPLETED | OUTPATIENT
Start: 2024-03-26 | End: 2024-03-26

## 2024-03-26 RX ORDER — TETANUS TOXOID, REDUCED DIPHTHERIA TOXOID AND ACELLULAR PERTUSSIS VACCINE, ADSORBED 5; 2.5; 8; 8; 2.5 [IU]/.5ML; [IU]/.5ML; UG/.5ML; UG/.5ML; UG/.5ML
0.5 SUSPENSION INTRAMUSCULAR ONCE
Refills: 0 | Status: COMPLETED | OUTPATIENT
Start: 2024-03-26 | End: 2024-03-26

## 2024-03-26 RX ORDER — ACETAMINOPHEN 500 MG
650 TABLET ORAL EVERY 6 HOURS
Refills: 0 | Status: DISCONTINUED | OUTPATIENT
Start: 2024-03-26 | End: 2024-03-28

## 2024-03-26 RX ORDER — CEFEPIME 1 G/1
2000 INJECTION, POWDER, FOR SOLUTION INTRAMUSCULAR; INTRAVENOUS ONCE
Refills: 0 | Status: DISCONTINUED | OUTPATIENT
Start: 2024-03-26 | End: 2024-03-26

## 2024-03-26 RX ORDER — FUROSEMIDE 40 MG
40 TABLET ORAL ONCE
Refills: 0 | Status: COMPLETED | OUTPATIENT
Start: 2024-03-26 | End: 2024-03-26

## 2024-03-26 RX ADMIN — SODIUM CHLORIDE 250 MILLILITER(S): 9 INJECTION INTRAMUSCULAR; INTRAVENOUS; SUBCUTANEOUS at 18:29

## 2024-03-26 RX ADMIN — Medication 40 MILLIGRAM(S): at 18:56

## 2024-03-26 RX ADMIN — Medication 25 MILLIGRAM(S): at 18:56

## 2024-03-26 RX ADMIN — TETANUS TOXOID, REDUCED DIPHTHERIA TOXOID AND ACELLULAR PERTUSSIS VACCINE, ADSORBED 0.5 MILLILITER(S): 5; 2.5; 8; 8; 2.5 SUSPENSION INTRAMUSCULAR at 15:33

## 2024-03-26 RX ADMIN — SODIUM CHLORIDE 250 MILLILITER(S): 9 INJECTION INTRAMUSCULAR; INTRAVENOUS; SUBCUTANEOUS at 15:50

## 2024-03-26 RX ADMIN — Medication 5 MILLIGRAM(S): at 18:56

## 2024-03-26 NOTE — H&P ADULT - PROBLEM SELECTOR PLAN 5
- Hg 11 (bL 10 -11)   - Also on AC  - ddx: AocD vs LIZA   - Check iron studies, b12, folate - Hg 11 (bL 10 -11)   - Also on AC  - Ddx: AoCD vs LIZA   - Check iron studies, B12, folate - Home med: Mtx 2.5 qwk, Prednisone 2.5mg qd

## 2024-03-26 NOTE — ED PROVIDER NOTE - SKIN, MLM
Two linear 2cm open wounds to dorsum of R foot with purulence, 1-2cm surrounding erythema/tenderness. no crepitus or fluctuance. Two linear 2cm open wounds to dorsum of R foot with granulation tissue, mild surrounding erythema. no crepitus or fluctuance.

## 2024-03-26 NOTE — H&P ADULT - PROBLEM SELECTOR PLAN 1
- Clinically euvolemic   - EKG: Afib   - Trop: 65 > 64     BNP: 5540  - CTa chest: pleural effusion, pulm htn    - IV lasix     - I&O, daily wt   - telemetry   - check echo (ordered)  - cardio consult to be called in AM   - C/w home meds: - Clinically euvolemic   - EKG: Afib   - Trop: 65 > 64     BNP: 5540  - CTA chest: neg PE, pleural effusion, pulm htn    - IV lasix     - I&O, daily wt   - telemetry   - check echo (ordered)  - cardio consult to be called in AM   - C/w home med: BB, Statin, ASA

## 2024-03-26 NOTE — H&P ADULT - NSICDXPASTSURGICALHX_GEN_ALL_CORE_FT
Lab Results   Component Value Date    HGBA1C 5 1 10/12/2021     Diabetes controlled with diet  Continue gabapentin for neuropathy  Monitor hemoglobin A1c periodically  Follow-up with PCP PAST SURGICAL HISTORY:  No significant past surgical history

## 2024-03-26 NOTE — H&P ADULT - HISTORY OF PRESENT ILLNESS
83y M pmh  A-fib on Coumadin/metoprolol, CAD s/p stent, HTN, HLD, DM2, R eye blindness s/p CVA, dementia presents accompanied by family for multiple complaints.  Per family pt had multiple witnessed and unwitnessed falls last week in [place] and at home.  Patient went to see his cardiologist a few days ago was found to be in A-fib RVR rate 150s (EKG on hand) but refused to go to the ED at that time.  Per wife he has been more lethargic than usual with unintentional  pound weight since June.   Had urinary incontinence yesterday, has been endorsing lower extremity swelling and shortness of breath.  Has been noncompliant with Coumadin last week, family believes he was compliant this week.  Wife presents with paperwork  from cardiologist/PMD stating plans to workup A-fib.    ROS limited      83y M pmh  A-fib, CAD s/p stent, HTN, HLD, DM2, R eye blindness s/p CVA, RA, dementia presents accompanied by family for multiple complaints.  Per family pt had multiple witnessed and unwitnessed falls last week in [place] and at home.  Patient went to see his cardiologist a few days ago was found to be in A-fib RVR rate 150s (EKG on hand) but refused to go to the ED at that time.  Per wife he has been more lethargic than usual with unintentional  pound weight since June.   Had urinary incontinence yesterday, has been endorsing lower extremity swelling and shortness of breath.  Has been noncompliant with Coumadin last week, family believes he was compliant this week.  Wife presents with paperwork  from cardiologist/PMD stating plans to workup A-fib.      ROS: Denies CP, palpitation, N/V/D, fever, cough, chills, dizziness, abm pain, recent travel, sick contact     A 10-system ROS was performed and is negative except as noted above and/or in the HPI.

## 2024-03-26 NOTE — ED ADULT NURSE NOTE - NSFALLHARMRISKINTERV_ED_ALL_ED

## 2024-03-26 NOTE — H&P ADULT - ASSESSMENT
83y M pmh  A-fib on Coumadin/metoprolol, CAD s/p stent, HTN, HLD, DM2, R eye blindness s/p CVA, dementia presents s/p fall and recent Afib RVR a/f further eval        83y M pmh  A-fib, CAD s/p stent, HTN, HLD, DM2, R eye blindness s/p CVA, dementia presents s/p fall and recent Afib RVR a/f further eval

## 2024-03-26 NOTE — H&P ADULT - NSHPPHYSICALEXAM_GEN_ALL_CORE
T(C): 36.6 (03-27-24 @ 04:35), Max: 36.8 (03-26-24 @ 15:03)  HR: 112 (03-27-24 @ 04:35) (94 - 121)  BP: 105/67 (03-27-24 @ 04:35) (105/67 - 128/64)  RR: 18 (03-27-24 @ 04:35) (18 - 20)  SpO2: 98% (03-27-24 @ 04:35) (94% - 100%)    CONSTITUTIONAL: Well groomed, no apparent distress  EYES: PERRLA , EOMI  ENMT: MMM. Normal dentition  RESP: No respiratory distress, no use of accessory muscles; CTA b/l  CV: +S1S2, irregular, no peripheral edema  GI: Soft, NTND, no RGR  MSK: Normal gait; No digital clubbing or cyanosis; normal pain free ROM x4 extremities   SKIN: No rashes or ulcers noted,  NEURO: CN II-XII grossly intact; normal reflexes, sensation intact throughout   PSYCH: A+O x 3, mood and affect appropriate

## 2024-03-26 NOTE — ED PROVIDER NOTE - NSICDXPASTMEDICALHX_GEN_ALL_CORE_FT
PAST MEDICAL HISTORY:  CAD (coronary artery disease)     Chronic atrial fibrillation     HTN (hypertension)

## 2024-03-26 NOTE — ED PROVIDER NOTE - CLINICAL SUMMARY MEDICAL DECISION MAKING FREE TEXT BOX
Eldelry male referred to ed for Multiple medical complaints, Rapid afib, weight loss, chronic wound rt foot, Pt chroncially noncompliant as per family. Has fallen recently twice. Plan Ct head ro ich, check basic labs ro electrolyte  abnormalities/Anemia. CT chest/abd eval for trauma and neoplasm given former smoker and substantial weight loss.  Probable admit  Pedro Gomez MD, Facep Elderly male referred to ed for Multiple medical complaints, Rapid afib, weight loss, chronic wound rt foot, Pt chronically noncompliant as per family. Has fallen recently twice. Plan Ct head ro ich, check basic labs ro electrolyte  abnormalities/Anemia. CT chest/abd eval for trauma and neoplasm given former smoker and substantial weight loss.  Probable admit  Pedro Gomez MD, Facep

## 2024-03-26 NOTE — H&P ADULT - PROBLEM SELECTOR PLAN 7
- Hold home DM meds while inpatient  - ISS ACHS  - DASH/CC diet  - Check A1c - C/w BB, ACE-I, Statin, ASA

## 2024-03-26 NOTE — H&P ADULT - NSHPLABSRESULTS_GEN_ALL_CORE
11.0   8.96  )-----------( 282      ( 26 Mar 2024 15:31 )             37.1         142  |  108  |  21  ----------------------------<  158<H>  4.5   |  20<L>  |  0.92    Ca    9.5      26 Mar 2024 15:31  Phos  2.8       Mg     1.6         TPro  6.7  /  Alb  3.5  /  TBili  0.6  /  DBili  x   /  AST  18  /  ALT  21  /  AlkPhos  95      Troponin T, High Sensitivity Result: 65: ng/L (24 @ 15:31)  Troponin T, High Sensitivity Result: 64: ng/L (24 @ 17:57)  Pro-Brain Natriuretic Peptide: 5540 pg/mL (24 @ 15:31)    Alcohol, Blood: <10 mg/dL (24 @ 15:31)  Digoxin Level, Serum: 1.3 ng/mL (24 @ 15:31)    Urinalysis Basic - ( 26 Mar 2024 17:37 )  Color: Dark Yellow / Appearance: Clear / S.028 / pH: x  Gluc: x / Ketone: Trace mg/dL  / Bili: Negative / Urobili: 0.2 mg/dL   Blood: x / Protein: 30 mg/dL / Nitrite: Negative   Leuk Esterase: Negative / RBC: 2 /HPF / WBC 1 /HPF   Sq Epi: x / Non Sq Epi: 1 /HPF / Bacteria: Negative /HPF    - - - - - - - - - - - - - - - - - - - - - - - - - - - - - - - - - - - - - - - - - - - - - - - - - - - -       EKG PERSONALLY REVIEWED:  Afib 92 bpm     IMAGES PERSONALLY REVIEWED:     < from: Xray Chest 1 View AP/PA (24 @ 16:35) >  IMPRESSION: No acute  radiographic traumatic findings.    < from: Xray Foot AP + Lateral + Oblique, Right (24 @ 16:35) >  IMPRESSION: There is mild to moderate first metatarsophalangeal   osteoarthrosis with a large lateral spur. There is no acute fracture or   dislocation. There is a large plantar calcaneal spur. Arterial   calcification is noted.    < from: Xray Pelvis AP only (24 @ 16:36) >  IMPRESSION: There is aleft total hip arthroplasty with uncemented   components in the expected position. There is no osteolysis or acute   periprosthetic fracture. There are degenerative changes of the lumbar   spine. Arterial calcifications are noted.    < from: CT Head No Cont (24 @ 18:08) >  Head CT: No CT evidence of acute intracranial hemorrhage.  Partially calcified mass in the left frontal extra-axial space adjacent   to the falx measuring 2.5 cm AP by 2.4 cm transverse by 1 cm thick   suggesting meningioma. MRI with gadolinium may be helpful for   further evaluation, if clinically indicated.    C-spine CT:  No acute fracture.    < from: CT Angio Chest PE Protocol w/ IV Cont (24 @ 18:08) >  IMPRESSION: No pulmonary embolus. Small pleural effusion suggestive of fluid overload      Cardiomegaly. Dilated main pulmonary artery suggestive of pulmonary hypertension 11.0   8.96  )-----------( 282      ( 26 Mar 2024 15:31 )             37.1         142  |  108  |  21  ----------------------------<  158<H>  4.5   |  20<L>  |  0.92    Ca    9.5      26 Mar 2024 15:31  Phos  2.8       Mg     1.6         TPro  6.7  /  Alb  3.5  /  TBili  0.6  /  DBili  x   /  AST  18  /  ALT  21  /  AlkPhos  95      Troponin T, High Sensitivity Result: 65: ng/L (24 @ 15:31)  Troponin T, High Sensitivity Result: 64: ng/L (24 @ 17:57)  Pro-Brain Natriuretic Peptide: 5540 pg/mL (24 @ 15:31)    Alcohol, Blood: <10 mg/dL (24 @ 15:31)  Digoxin Level, Serum: 1.3 ng/mL (24 @ 15:31)    Urinalysis Basic - ( 26 Mar 2024 17:37 )  Color: Dark Yellow / Appearance: Clear / S.028 / pH: x  Gluc: x / Ketone: Trace mg/dL  / Bili: Negative / Urobili: 0.2 mg/dL   Blood: x / Protein: 30 mg/dL / Nitrite: Negative   Leuk Esterase: Negative / RBC: 2 /HPF / WBC 1 /HPF   Sq Epi: x / Non Sq Epi: 1 /HPF / Bacteria: Negative /HPF    - - - - - - - - - - - - - - - - - - - - - - - - - - - - - - - - - - - - - - - - - - - - - - - - - - - -       EKG PERSONALLY REVIEWED:  Afib 92 bpm     IMAGES PERSONALLY REVIEWED:     < from: Xray Chest 1 View AP/PA (24 @ 16:35) >  IMPRESSION: No acute  radiographic traumatic findings.    < from: Xray Foot AP + Lateral + Oblique, Right (24 @ 16:35) >  IMPRESSION: There is mild to moderate first metatarsophalangeal   osteoarthrosis with a large lateral spur. There is no acute fracture or   dislocation. There is a large plantar calcaneal spur. Arterial   calcification is noted.    < from: Xray Pelvis AP only (24 @ 16:36) >  IMPRESSION: There is a left total hip arthroplasty with uncemented   components in the expected position. There is no osteolysis or acute   periprosthetic fracture. There are degenerative changes of the lumbar   spine. Arterial calcifications are noted.    < from: CT Head No Cont (24 @ 18:08) >  Head CT: No CT evidence of acute intracranial hemorrhage.  Partially calcified mass in the left frontal extra-axial space adjacent   to the falx measuring 2.5 cm AP by 2.4 cm transverse by 1 cm thick   suggesting meningioma. MRI with gadolinium may be helpful for   further evaluation, if clinically indicated.    C-spine CT:  No acute fracture.    < from: CT Angio Chest PE Protocol w/ IV Cont (24 @ 18:08) >  IMPRESSION: No pulmonary embolus. Small pleural effusion suggestive of fluid overload      Cardiomegaly. Dilated main pulmonary artery suggestive of pulmonary hypertension

## 2024-03-26 NOTE — ED ADULT NURSE NOTE - EXTENSIONS OF SELF_ADULT
Health Maintenance Due   Topic Date Due   • HPV (Male) Vaccine (2 - Male 3-dose series) 06/17/2016   • Influenza Vaccine (1) 08/01/2018       Patient is due for the topics as listed above and wishes to proceed with them. Orders placed for Immunization(s) HPV and Influenza           None

## 2024-03-26 NOTE — H&P ADULT - PROBLEM SELECTOR PLAN 2
- EKG: Afib   CXR clear     - Trop: 65 > 64     BNP: 5540 ( ?HF component )  - CTA chest: no PE, (+) pleural effusion, pulm htn   - CHADSVASc: 6 (age, sex, htn, CVA hx)  - DDx: infectious vs metabolic abnormalities vs cardiac dz  - Afebrile, VSS, no leukocytosis, CXR non infectious, UA: noninfectious neg, infections less likely  - ED:  Lopressor    - C/w  Metop    - AC -- Eliquis/Heparin gtt ????  - F/u TSH  - Telemetry  - Monitor electrolyte replete as needed,  keep  Mg 2 , Phos 4   - Echo (ordered) to eval structural or valvular abnormalities  - LE U/S to eval for DVT ?????  - Cardio/EP consult to be called in AM - EKG: Afib   CXR clear     - Trop: 65 > 64     BNP: 5540 ( ?HF component )  - CTA chest: no PE, (+) pleural effusion, pulm htn   - CHADSVASc: 6 (age, sex, htn, CVA hx)  - DDx: infectious vs metabolic abnormalities vs cardiac dz  - Afebrile, VSS, no leukocytosis, CXR non infectious, UA: noninfectious neg,   infections less likely. F/u Bcx, Ucx   - ED:  Lopressor    - C/w  Metop    - AC -- Eliquis/Heparin gtt ????  - F/u TSH  - Telemetry  - Monitor electrolyte replete as needed,  keep  Mg 2 , Phos 4   - Echo (ordered) to eval structural or valvular abnormalities  - LE U/S to eval for DVT ?????  - Cardio/EP consult to be called in AM - EKG: Afib   CXR clear     - Trop: 65 > 64     BNP: 5540 ( ?HF component )  - CTA chest: no PE, (+) pleural effusion, pulm htn   - CHADSVASc: 6 (age, sex, htn, CVA hx)  - DDx: infectious vs metabolic abnormalities vs cardiac dz  - Afebrile, VSS, no leukocytosis, CXR non infectious, UA: noninfectious,   infections less likely. F/u Bcx, Ucx   - ED:  Lopressor 25mg PO & IV 5mg x1    - C/w  Metop  tart 25mg BID   - C/w Xarelto   - C/w Digoxin, check level   - F/u TSH  - Telemetry  - Monitor electrolyte replete as needed,  keep  Mg 2 , Phos 4   - Echo (ordered) to eval structural or valvular abnormalities  - Cardio/EP consult to be called in AM

## 2024-03-26 NOTE — ED ADULT NURSE NOTE - OBJECTIVE STATEMENT
84 yo male with a PMH of CAD s/p 1 stent on xarelto, a.fib, anemia presents to the ED from home complaining of multiple medical complaints. Patient's daughter at bedside, reports that all of last week patient was fatigued and was noted to have generalized malaise and did not get out of bed. Daughter took him to the doctor's on Friday and MD advised patient to go to the ER due to EKG changes. Patient refused and today presents due to fatigue and weakness. When questioning patient, patient reports "I'm fine, I don't need to be here." Patient endorsing some lightheadedness last week and endorses falling twice due to dizziness. No LOC or head injury noted. Patient denies any chest pain or SOB, denies black tarry stools or hematuria. Denies headache, vision changes, chest pain, shortness of breath, abdominal pain, nausea, vomiting, diarrhea, fevers, chills, dysuria, hematuria, recent illness travel.

## 2024-03-26 NOTE — ED PROVIDER NOTE - PROGRESS NOTE DETAILS
Spoke with patient's cardiologist Dr. Levy, agrees with workup.  If medicine admit will go to Dr. Purcell. gordon - Tim Maya PA-C Spoke with patient's cardiologist Dr. Penn, agrees with workup.  If medicine admit will go to Dr. Purcell. gordon - Tim Maya PA-C Pulmonary edema on CT consistent with elevated proBNP and pedal edema.  Repeat troponin stable, low clinical suspicion for ACS.  CT head finding meningioma discussed with patient and family members who are aware.  Patient currently in A-fib rate 110s to 120s, will treat with small dose of IV beta-blocker to treat fluid overload as suspected cause with Lasix.  Patient stable for admission. - Tim Maya PA-C

## 2024-03-26 NOTE — H&P ADULT - PROBLEM SELECTOR PLAN 6
- C/w BB, ACE-I, Statin, ASA - Hg 11 (bL 10 -11)   - Also on AC  - Ddx: AoCD vs LIZA   - Check iron studies, B12, folate

## 2024-03-26 NOTE — ED ADULT TRIAGE NOTE - CHIEF COMPLAINT QUOTE
multiple medical complaints  seen by PCP, rapid afib, feeling unwell x 1 week, fevers/ chills,  decreased PO, incontinence last week, anemia

## 2024-03-26 NOTE — ED PROVIDER NOTE - OBJECTIVE STATEMENT
83-year-old male with history of A-fib on Coumadin/metoprolol, CAD with stent, HTN, HLD, diabetes presents with multiple complaints.  Patient presents accompanied by family member for collateral reports multiple witnessed and unwitnessed falls last week in [place] and at home.  Patient went to see his cardiologist a few days ago was found to be in A-fib RVR rate 150s, EKG on hand, refused to go to the ED at that time.  Per wife he has been more lethargic than usual with unintentional 45 pound weight since June of this year.  Urinary incontinence yesterday.  He has been endorsing lower extremity swelling and shortness of breath.  Has been noncompliant with Coumadin.  Patient manage he historian for additional ROS, and cooperative with exam.  Wife presents with paperwork and cardiologist wanted to evaluate for workup of A-fib. 83-year-old male with history of A-fib on Coumadin/metoprolol, CAD with stent, HTN, HLD, diabetes, R eye blindness s/p CVA, dementia presents with multiple complaints.  Patient presents accompanied by family member for collateral reports multiple witnessed and unwitnessed falls last week in [place] and at home.  Patient went to see his cardiologist a few days ago was found to be in A-fib RVR rate 150s, EKG on hand, refused to go to the ED at that time.  Per wife he has been more lethargic than usual with unintentional  pound weight since June of this year.  Urinary incontinence yesterday.  He has been endorsing lower extremity swelling and shortness of breath.  Has been noncompliant with Coumadin last week, family believes he was compliant this week.  Patient family at bedside for additional ROS.  Wife presents with paperwork stating cardiologist/PMD wanted to evaluate for workup of A-fib.

## 2024-03-26 NOTE — H&P ADULT - PROBLEM SELECTOR PLAN 8
- Bp stable, CTM  - C/w home meds - Hold home DM meds while inpatient  - ISS ACHS  - DASH/CC diet  - Check A1c

## 2024-03-26 NOTE — H&P ADULT - PROBLEM SELECTOR PLAN 10
Pt was seen for session at 1210.  Grandmother was present at bedside.   10/05/21 0011   Reason for Consult   Reason for Consult Ongoing care for established patient;Potential for extended/repeated hospitalizations   Referral Source Music Therapist   Assessment   Session Type Individual   Assessment Type Initial   Person Assessed Patient   Present Patient;Caregiver   Caregiver Grandparent   Plan No further sessions planned at this time   Goals Normalize environment;Support expressive communication;Promote independence and feelings of control   Intervention   Intervention Method Recreative   Recreative Method Instrument play   Mood/Affect/Behavior/Coping   Mood Appropriate   Affect Appropriate range   Behavior Engaged   Music Preferences   Preferred Genre(s) Children's   Outcomes   Outcomes Improved eye contact;Engaged in creative self-expression;Other (comment)  (Pt became tired after 20 minutes and indicated that she wanted to end session.  This is the first time therapist observed this in pt.)   Suggested Interventions   Intervention Method Recreative   Recreative Method Instrument play   Pt engaged quickly and tried each instrument in the bag.  She chose songs and played along with the melodic rhythm while therapist sang.  Pt demonstrated playfulness and bright affect.  After 20 minutes, pt indicated she wanted session to end, stating, \"I'm tired\".  Pt has not demonstrated this before in any session. No pain indicated.  RN updated.  Plan:  Continue to use music therapy to promote autonomy and creative self expression, to normalize environment, and to improve mood.  Recommend sessions 1-2x/week.  Follow up with pt during next admission, as she is tentatively scheduled to be discharged this pm.    Mago Murray, [facility]  Music Therapist Board Certified  Phone [phone]     - DVT ppx: Eliquis   - GI ppx: PPI   - Diet: DASH/ CC  - PT consult - DVT ppx: Xarelto  - GI ppx: PPI   - Diet: DASH/ CC  - PT consult - c/w Statin

## 2024-03-26 NOTE — ED PROVIDER NOTE - ATTENDING APP SHARED VISIT CONTRIBUTION OF CARE
PMD Gindea  83ym pmh Afib, Prostated Ca, DM on metformin, Rheum arthritis on metrotrexate, HLD, Pt comes to e c/o 100 lbs weight loss since last year and 40lb since june.  Pt comes to ed referred to ed by pmd for c/o rapid afib 4 d ago pt advised to come to ed. PT has lost vision in rt eye 9m ago. Seen by neuro optho dx as"stroke in the eye" PT was in [place] last week. Pt had fallen twice few days ago. PE Adult male awake alert looking stated age. Heent normocephalic atraumatic neck supple chest clear anterior & posterior cv irregualr, abd soft +bs no mass guarding neuro gcs 15 speech fluent power 5/5 all extr, MSK rt foot superficial woulds granulating.   Pedro Gomez MD, Facep

## 2024-03-27 ENCOUNTER — RESULT REVIEW (OUTPATIENT)
Age: 84
End: 2024-03-27

## 2024-03-27 DIAGNOSIS — E78.5 HYPERLIPIDEMIA, UNSPECIFIED: ICD-10-CM

## 2024-03-27 DIAGNOSIS — I50.21 ACUTE SYSTOLIC (CONGESTIVE) HEART FAILURE: ICD-10-CM

## 2024-03-27 DIAGNOSIS — I48.0 PAROXYSMAL ATRIAL FIBRILLATION: ICD-10-CM

## 2024-03-27 DIAGNOSIS — I48.20 CHRONIC ATRIAL FIBRILLATION, UNSPECIFIED: ICD-10-CM

## 2024-03-27 DIAGNOSIS — I63.9 CEREBRAL INFARCTION, UNSPECIFIED: ICD-10-CM
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
## 2024-03-27 DIAGNOSIS — M06.9 RHEUMATOID ARTHRITIS, UNSPECIFIED: ICD-10-CM

## 2024-03-27 DIAGNOSIS — I10 ESSENTIAL (PRIMARY) HYPERTENSION: ICD-10-CM

## 2024-03-27 DIAGNOSIS — D64.9 ANEMIA, UNSPECIFIED: ICD-10-CM

## 2024-03-27 DIAGNOSIS — E11.9 TYPE 2 DIABETES MELLITUS WITHOUT COMPLICATIONS: ICD-10-CM

## 2024-03-27 DIAGNOSIS — I25.10 ATHEROSCLEROTIC HEART DISEASE OF NATIVE CORONARY ARTERY WITHOUT ANGINA PECTORIS: ICD-10-CM

## 2024-03-27 DIAGNOSIS — Z29.9 ENCOUNTER FOR PROPHYLACTIC MEASURES, UNSPECIFIED: ICD-10-CM

## 2024-03-27 LAB
A1C WITH ESTIMATED AVERAGE GLUCOSE RESULT: 6.4 % — HIGH (ref 4–5.6)
ANION GAP SERPL CALC-SCNC: 17 MMOL/L — SIGNIFICANT CHANGE UP (ref 5–17)
BUN SERPL-MCNC: 21 MG/DL — SIGNIFICANT CHANGE UP (ref 7–23)
CALCIUM SERPL-MCNC: 9.4 MG/DL — SIGNIFICANT CHANGE UP (ref 8.4–10.5)
CHLORIDE SERPL-SCNC: 104 MMOL/L — SIGNIFICANT CHANGE UP (ref 96–108)
CHOLEST SERPL-MCNC: 91 MG/DL — SIGNIFICANT CHANGE UP
CO2 SERPL-SCNC: 20 MMOL/L — LOW (ref 22–31)
CREAT SERPL-MCNC: 0.93 MG/DL — SIGNIFICANT CHANGE UP (ref 0.5–1.3)
EGFR: 82 ML/MIN/1.73M2 — SIGNIFICANT CHANGE UP
ERYTHROCYTE [SEDIMENTATION RATE] IN BLOOD: 48 MM/HR — HIGH (ref 0–20)
ESTIMATED AVERAGE GLUCOSE: 137 MG/DL — HIGH (ref 68–114)
FOLATE SERPL-MCNC: 4.7 NG/ML — SIGNIFICANT CHANGE UP
GLUCOSE BLDC GLUCOMTR-MCNC: 139 MG/DL — HIGH (ref 70–99)
GLUCOSE BLDC GLUCOMTR-MCNC: 140 MG/DL — HIGH (ref 70–99)
GLUCOSE BLDC GLUCOMTR-MCNC: 145 MG/DL — HIGH (ref 70–99)
GLUCOSE BLDC GLUCOMTR-MCNC: 173 MG/DL — HIGH (ref 70–99)
GLUCOSE BLDC GLUCOMTR-MCNC: 239 MG/DL — HIGH (ref 70–99)
GLUCOSE SERPL-MCNC: 146 MG/DL — HIGH (ref 70–99)
HCT VFR BLD CALC: 35.2 % — LOW (ref 39–50)
HDLC SERPL-MCNC: 33 MG/DL — LOW
HGB BLD-MCNC: 10.4 G/DL — LOW (ref 13–17)
INR BLD: 2.77 RATIO — HIGH (ref 0.85–1.18)
LIPID PNL WITH DIRECT LDL SERPL: 41 MG/DL — SIGNIFICANT CHANGE UP
MAGNESIUM SERPL-MCNC: 1.4 MG/DL — LOW (ref 1.6–2.6)
MCHC RBC-ENTMCNC: 27.5 PG — SIGNIFICANT CHANGE UP (ref 27–34)
MCHC RBC-ENTMCNC: 29.5 GM/DL — LOW (ref 32–36)
MCV RBC AUTO: 93.1 FL — SIGNIFICANT CHANGE UP (ref 80–100)
NON HDL CHOLESTEROL: 59 MG/DL — SIGNIFICANT CHANGE UP
NRBC # BLD: 0 /100 WBCS — SIGNIFICANT CHANGE UP (ref 0–0)
PLATELET # BLD AUTO: 269 K/UL — SIGNIFICANT CHANGE UP (ref 150–400)
POTASSIUM SERPL-MCNC: 4.1 MMOL/L — SIGNIFICANT CHANGE UP (ref 3.5–5.3)
POTASSIUM SERPL-SCNC: 4.1 MMOL/L — SIGNIFICANT CHANGE UP (ref 3.5–5.3)
PROTHROM AB SERPL-ACNC: 29.6 SEC — HIGH (ref 9.5–13)
RBC # BLD: 3.78 M/UL — LOW (ref 4.2–5.8)
RBC # FLD: 18.5 % — HIGH (ref 10.3–14.5)
SODIUM SERPL-SCNC: 141 MMOL/L — SIGNIFICANT CHANGE UP (ref 135–145)
TRIGL SERPL-MCNC: 88 MG/DL — SIGNIFICANT CHANGE UP
TSH SERPL-MCNC: 0.49 UIU/ML — SIGNIFICANT CHANGE UP (ref 0.27–4.2)
VIT B12 SERPL-MCNC: 1109 PG/ML — SIGNIFICANT CHANGE UP (ref 232–1245)
WBC # BLD: 10.06 K/UL — SIGNIFICANT CHANGE UP (ref 3.8–10.5)
WBC # FLD AUTO: 10.06 K/UL — SIGNIFICANT CHANGE UP (ref 3.8–10.5)

## 2024-03-27 PROCEDURE — 93306 TTE W/DOPPLER COMPLETE: CPT | Mod: 26

## 2024-03-27 RX ORDER — SENNA PLUS 8.6 MG/1
2 TABLET ORAL AT BEDTIME
Refills: 0 | Status: DISCONTINUED | OUTPATIENT
Start: 2024-03-27 | End: 2024-03-28

## 2024-03-27 RX ORDER — DIGOXIN 250 MCG
125 TABLET ORAL DAILY
Refills: 0 | Status: DISCONTINUED | OUTPATIENT
Start: 2024-03-27 | End: 2024-03-28

## 2024-03-27 RX ORDER — ATORVASTATIN CALCIUM 80 MG/1
20 TABLET, FILM COATED ORAL AT BEDTIME
Refills: 0 | Status: DISCONTINUED | OUTPATIENT
Start: 2024-03-27 | End: 2024-03-28

## 2024-03-27 RX ORDER — POLYETHYLENE GLYCOL 3350 17 G/17G
17 POWDER, FOR SOLUTION ORAL DAILY
Refills: 0 | Status: DISCONTINUED | OUTPATIENT
Start: 2024-03-27 | End: 2024-03-28

## 2024-03-27 RX ORDER — ASPIRIN/CALCIUM CARB/MAGNESIUM 324 MG
81 TABLET ORAL DAILY
Refills: 0 | Status: DISCONTINUED | OUTPATIENT
Start: 2024-03-27 | End: 2024-03-28

## 2024-03-27 RX ORDER — TAMSULOSIN HYDROCHLORIDE 0.4 MG/1
0.4 CAPSULE ORAL ONCE
Refills: 0 | Status: COMPLETED | OUTPATIENT
Start: 2024-03-27 | End: 2024-03-27

## 2024-03-27 RX ORDER — METOPROLOL TARTRATE 50 MG
25 TABLET ORAL
Refills: 0 | Status: DISCONTINUED | OUTPATIENT
Start: 2024-03-27 | End: 2024-03-28

## 2024-03-27 RX ORDER — RIVAROXABAN 15 MG-20MG
20 KIT ORAL
Refills: 0 | Status: DISCONTINUED | OUTPATIENT
Start: 2024-03-27 | End: 2024-03-28

## 2024-03-27 RX ORDER — TAMSULOSIN HYDROCHLORIDE 0.4 MG/1
0.4 CAPSULE ORAL AT BEDTIME
Refills: 0 | Status: DISCONTINUED | OUTPATIENT
Start: 2024-03-28 | End: 2024-03-28

## 2024-03-27 RX ORDER — METHOTREXATE 2.5 MG/1
2.5 TABLET ORAL
Refills: 0 | Status: DISCONTINUED | OUTPATIENT
Start: 2024-03-27 | End: 2024-03-28

## 2024-03-27 RX ORDER — MAGNESIUM SULFATE 500 MG/ML
2 VIAL (ML) INJECTION ONCE
Refills: 0 | Status: COMPLETED | OUTPATIENT
Start: 2024-03-27 | End: 2024-03-27

## 2024-03-27 RX ADMIN — Medication 2.5 MILLIGRAM(S): at 06:48

## 2024-03-27 RX ADMIN — RIVAROXABAN 20 MILLIGRAM(S): KIT at 17:11

## 2024-03-27 RX ADMIN — Medication 81 MILLIGRAM(S): at 08:18

## 2024-03-27 RX ADMIN — Medication 25 MILLIGRAM(S): at 06:49

## 2024-03-27 RX ADMIN — Medication 650 MILLIGRAM(S): at 22:05

## 2024-03-27 RX ADMIN — SENNA PLUS 2 TABLET(S): 8.6 TABLET ORAL at 22:04

## 2024-03-27 RX ADMIN — Medication 3 MILLIGRAM(S): at 22:00

## 2024-03-27 RX ADMIN — TAMSULOSIN HYDROCHLORIDE 0.4 MILLIGRAM(S): 0.4 CAPSULE ORAL at 17:21

## 2024-03-27 RX ADMIN — ATORVASTATIN CALCIUM 20 MILLIGRAM(S): 80 TABLET, FILM COATED ORAL at 22:04

## 2024-03-27 RX ADMIN — Medication 1: at 08:17

## 2024-03-27 RX ADMIN — Medication 25 GRAM(S): at 17:12

## 2024-03-27 RX ADMIN — POLYETHYLENE GLYCOL 3350 17 GRAM(S): 17 POWDER, FOR SOLUTION ORAL at 08:17

## 2024-03-27 RX ADMIN — Medication 40 MILLIGRAM(S): at 01:05

## 2024-03-27 RX ADMIN — Medication 125 MICROGRAM(S): at 06:48

## 2024-03-27 RX ADMIN — Medication 25 MILLIGRAM(S): at 17:12

## 2024-03-27 RX ADMIN — Medication 650 MILLIGRAM(S): at 23:00

## 2024-03-27 NOTE — PHYSICAL THERAPY INITIAL EVALUATION ADULT - ADDITIONAL COMMENTS
Patient lives in pvt house with spouse, 1  steps to enter. Chair lift inside.  Patient ambulated with straight cane independent.  pt owns rw, w/c bed side comode at home.

## 2024-03-27 NOTE — PHYSICAL THERAPY INITIAL EVALUATION ADULT - PERTINENT HX OF CURRENT PROBLEM, REHAB EVAL
83-year-old male with history of A-fib on Coumadin/metoprolol, CAD with stent, HTN, HLD, diabetes, R eye blindness s/p CVA, dementia presents with multiple complaints.  Patient presents accompanied by family member for collateral reports multiple witnessed and unwitnessed falls last week in [place] and at home.  Patient went to see his cardiologist a few days ago was found to be in A-fib RVR rate 150s, EKG on hand, refused to go to the ED at that time.  Per wife he has been more lethargic than usual with unintentional  pound weight since June of this year.  Urinary incontinence yesterday.  He has been endorsing lower extremity swelling and shortness of breath.CTA chest: neg PE, pleural effusion, pulm htn    CT head/ C-spine: no ICH, acute fx or traumatic malalignment. x ray pelvis- There is a left total hip arthroplasty with uncemented components in the expected position. There is no osteolysis or acute   periprosthetic fracture.  x ray foot- : There is mild to moderate first metatarsophalangeal   osteoarthrosis with a large lateral spur. There is no acute fracture or   dislocation. There is a large plantar calcaneal spur. CT a/p- No sequela of acute traumatic injury in the abdomen or pelvis.

## 2024-03-27 NOTE — PHYSICAL THERAPY INITIAL EVALUATION ADULT - NS ASR RISK AREAS PT EVAL
This PT recommended use of RW for ambulation.Pt's daughter verbalized understanding. Pt req  review practice./fall

## 2024-03-28 ENCOUNTER — TRANSCRIPTION ENCOUNTER (OUTPATIENT)
Age: 84
End: 2024-03-28

## 2024-03-28 VITALS
OXYGEN SATURATION: 98 % | HEART RATE: 114 BPM | SYSTOLIC BLOOD PRESSURE: 130 MMHG | RESPIRATION RATE: 18 BRPM | TEMPERATURE: 98 F | DIASTOLIC BLOOD PRESSURE: 68 MMHG

## 2024-03-28 LAB
ANION GAP SERPL CALC-SCNC: 12 MMOL/L — SIGNIFICANT CHANGE UP (ref 5–17)
BUN SERPL-MCNC: 22 MG/DL — SIGNIFICANT CHANGE UP (ref 7–23)
CALCIUM SERPL-MCNC: 8.4 MG/DL — SIGNIFICANT CHANGE UP (ref 8.4–10.5)
CHLORIDE SERPL-SCNC: 102 MMOL/L — SIGNIFICANT CHANGE UP (ref 96–108)
CO2 SERPL-SCNC: 24 MMOL/L — SIGNIFICANT CHANGE UP (ref 22–31)
CREAT SERPL-MCNC: 1.03 MG/DL — SIGNIFICANT CHANGE UP (ref 0.5–1.3)
CULTURE RESULTS: SIGNIFICANT CHANGE UP
EGFR: 73 ML/MIN/1.73M2 — SIGNIFICANT CHANGE UP
GLUCOSE BLDC GLUCOMTR-MCNC: 187 MG/DL — HIGH (ref 70–99)
GLUCOSE BLDC GLUCOMTR-MCNC: 198 MG/DL — HIGH (ref 70–99)
GLUCOSE BLDC GLUCOMTR-MCNC: 273 MG/DL — HIGH (ref 70–99)
GLUCOSE SERPL-MCNC: 189 MG/DL — HIGH (ref 70–99)
MAGNESIUM SERPL-MCNC: 1.7 MG/DL — SIGNIFICANT CHANGE UP (ref 1.6–2.6)
POTASSIUM SERPL-MCNC: 3.4 MMOL/L — LOW (ref 3.5–5.3)
POTASSIUM SERPL-SCNC: 3.4 MMOL/L — LOW (ref 3.5–5.3)
SODIUM SERPL-SCNC: 138 MMOL/L — SIGNIFICANT CHANGE UP (ref 135–145)
SPECIMEN SOURCE: SIGNIFICANT CHANGE UP

## 2024-03-28 PROCEDURE — 96374 THER/PROPH/DIAG INJ IV PUSH: CPT

## 2024-03-28 PROCEDURE — 80307 DRUG TEST PRSMV CHEM ANLYZR: CPT

## 2024-03-28 PROCEDURE — 80053 COMPREHEN METABOLIC PANEL: CPT

## 2024-03-28 PROCEDURE — 96375 TX/PRO/DX INJ NEW DRUG ADDON: CPT

## 2024-03-28 PROCEDURE — 84295 ASSAY OF SERUM SODIUM: CPT

## 2024-03-28 PROCEDURE — 87086 URINE CULTURE/COLONY COUNT: CPT

## 2024-03-28 PROCEDURE — 99223 1ST HOSP IP/OBS HIGH 75: CPT

## 2024-03-28 PROCEDURE — 71045 X-RAY EXAM CHEST 1 VIEW: CPT

## 2024-03-28 PROCEDURE — 84132 ASSAY OF SERUM POTASSIUM: CPT

## 2024-03-28 PROCEDURE — 83735 ASSAY OF MAGNESIUM: CPT

## 2024-03-28 PROCEDURE — 82435 ASSAY OF BLOOD CHLORIDE: CPT

## 2024-03-28 PROCEDURE — 85610 PROTHROMBIN TIME: CPT

## 2024-03-28 PROCEDURE — 84443 ASSAY THYROID STIM HORMONE: CPT

## 2024-03-28 PROCEDURE — 72125 CT NECK SPINE W/O DYE: CPT | Mod: MC

## 2024-03-28 PROCEDURE — 70450 CT HEAD/BRAIN W/O DYE: CPT | Mod: MC

## 2024-03-28 PROCEDURE — 85730 THROMBOPLASTIN TIME PARTIAL: CPT

## 2024-03-28 PROCEDURE — 71275 CT ANGIOGRAPHY CHEST: CPT | Mod: MC

## 2024-03-28 PROCEDURE — 86140 C-REACTIVE PROTEIN: CPT

## 2024-03-28 PROCEDURE — 73630 X-RAY EXAM OF FOOT: CPT

## 2024-03-28 PROCEDURE — 83880 ASSAY OF NATRIURETIC PEPTIDE: CPT

## 2024-03-28 PROCEDURE — 85027 COMPLETE CBC AUTOMATED: CPT

## 2024-03-28 PROCEDURE — 87040 BLOOD CULTURE FOR BACTERIA: CPT

## 2024-03-28 PROCEDURE — 85014 HEMATOCRIT: CPT

## 2024-03-28 PROCEDURE — 85018 HEMOGLOBIN: CPT

## 2024-03-28 PROCEDURE — 80162 ASSAY OF DIGOXIN TOTAL: CPT

## 2024-03-28 PROCEDURE — 99285 EMERGENCY DEPT VISIT HI MDM: CPT

## 2024-03-28 PROCEDURE — 96372 THER/PROPH/DIAG INJ SC/IM: CPT

## 2024-03-28 PROCEDURE — 72170 X-RAY EXAM OF PELVIS: CPT

## 2024-03-28 PROCEDURE — 90715 TDAP VACCINE 7 YRS/> IM: CPT

## 2024-03-28 PROCEDURE — 82746 ASSAY OF FOLIC ACID SERUM: CPT

## 2024-03-28 PROCEDURE — 82330 ASSAY OF CALCIUM: CPT

## 2024-03-28 PROCEDURE — 85652 RBC SED RATE AUTOMATED: CPT

## 2024-03-28 PROCEDURE — 82947 ASSAY GLUCOSE BLOOD QUANT: CPT

## 2024-03-28 PROCEDURE — 83690 ASSAY OF LIPASE: CPT

## 2024-03-28 PROCEDURE — 80061 LIPID PANEL: CPT

## 2024-03-28 PROCEDURE — 80048 BASIC METABOLIC PNL TOTAL CA: CPT

## 2024-03-28 PROCEDURE — 85025 COMPLETE CBC W/AUTO DIFF WBC: CPT

## 2024-03-28 PROCEDURE — 97161 PT EVAL LOW COMPLEX 20 MIN: CPT

## 2024-03-28 PROCEDURE — 83036 HEMOGLOBIN GLYCOSYLATED A1C: CPT

## 2024-03-28 PROCEDURE — 84100 ASSAY OF PHOSPHORUS: CPT

## 2024-03-28 PROCEDURE — 81001 URINALYSIS AUTO W/SCOPE: CPT

## 2024-03-28 PROCEDURE — 82803 BLOOD GASES ANY COMBINATION: CPT

## 2024-03-28 PROCEDURE — 93306 TTE W/DOPPLER COMPLETE: CPT

## 2024-03-28 PROCEDURE — 82962 GLUCOSE BLOOD TEST: CPT

## 2024-03-28 PROCEDURE — 36415 COLL VENOUS BLD VENIPUNCTURE: CPT

## 2024-03-28 PROCEDURE — 84484 ASSAY OF TROPONIN QUANT: CPT

## 2024-03-28 PROCEDURE — 74177 CT ABD & PELVIS W/CONTRAST: CPT | Mod: MC

## 2024-03-28 PROCEDURE — 82607 VITAMIN B-12: CPT

## 2024-03-28 PROCEDURE — 83605 ASSAY OF LACTIC ACID: CPT

## 2024-03-28 RX ORDER — FUROSEMIDE 40 MG
40 TABLET ORAL DAILY
Refills: 0 | Status: DISCONTINUED | OUTPATIENT
Start: 2024-03-29 | End: 2024-03-28

## 2024-03-28 RX ORDER — POTASSIUM CHLORIDE 20 MEQ
10 PACKET (EA) ORAL
Refills: 0 | Status: COMPLETED | OUTPATIENT
Start: 2024-03-28 | End: 2024-03-28

## 2024-03-28 RX ORDER — POTASSIUM CHLORIDE 20 MEQ
40 PACKET (EA) ORAL ONCE
Refills: 0 | Status: COMPLETED | OUTPATIENT
Start: 2024-03-28 | End: 2024-03-28

## 2024-03-28 RX ORDER — TAMSULOSIN HYDROCHLORIDE 0.4 MG/1
1 CAPSULE ORAL
Qty: 30 | Refills: 0
Start: 2024-03-28 | End: 2024-04-26

## 2024-03-28 RX ORDER — MAGNESIUM SULFATE 500 MG/ML
2 VIAL (ML) INJECTION ONCE
Refills: 0 | Status: COMPLETED | OUTPATIENT
Start: 2024-03-28 | End: 2024-03-28

## 2024-03-28 RX ADMIN — Medication 40 MILLIGRAM(S): at 01:01

## 2024-03-28 RX ADMIN — Medication 2.5 MILLIGRAM(S): at 05:49

## 2024-03-28 RX ADMIN — Medication 81 MILLIGRAM(S): at 12:07

## 2024-03-28 RX ADMIN — POLYETHYLENE GLYCOL 3350 17 GRAM(S): 17 POWDER, FOR SOLUTION ORAL at 12:09

## 2024-03-28 RX ADMIN — Medication 40 MILLIEQUIVALENT(S): at 08:03

## 2024-03-28 RX ADMIN — Medication 1: at 13:25

## 2024-03-28 RX ADMIN — Medication 100 MILLIEQUIVALENT(S): at 03:42

## 2024-03-28 RX ADMIN — ATORVASTATIN CALCIUM 20 MILLIGRAM(S): 80 TABLET, FILM COATED ORAL at 18:06

## 2024-03-28 RX ADMIN — Medication 40 MILLIEQUIVALENT(S): at 18:05

## 2024-03-28 RX ADMIN — Medication 650 MILLIGRAM(S): at 14:10

## 2024-03-28 RX ADMIN — Medication 25 MILLIGRAM(S): at 18:05

## 2024-03-28 RX ADMIN — Medication 25 GRAM(S): at 03:43

## 2024-03-28 RX ADMIN — Medication 1: at 09:11

## 2024-03-28 RX ADMIN — TAMSULOSIN HYDROCHLORIDE 0.4 MILLIGRAM(S): 0.4 CAPSULE ORAL at 18:05

## 2024-03-28 RX ADMIN — METHOTREXATE 2.5 MILLIGRAM(S): 2.5 TABLET ORAL at 12:07

## 2024-03-28 RX ADMIN — Medication 100 MILLIEQUIVALENT(S): at 05:02

## 2024-03-28 RX ADMIN — Medication 125 MICROGRAM(S): at 05:49

## 2024-03-28 RX ADMIN — RIVAROXABAN 20 MILLIGRAM(S): KIT at 18:05

## 2024-03-28 RX ADMIN — Medication 25 MILLIGRAM(S): at 05:49

## 2024-03-28 RX ADMIN — Medication 40 MILLIGRAM(S): at 01:00

## 2024-03-28 RX ADMIN — Medication 3: at 17:50

## 2024-03-28 NOTE — DIETITIAN INITIAL EVALUATION ADULT - PROBLEM SELECTOR PLAN 2
- EKG: Afib   CXR clear     - Trop: 65 > 64     BNP: 5540 ( ?HF component )  - CTA chest: no PE, (+) pleural effusion, pulm htn   - CHADSVASc: 6 (age, sex, htn, CVA hx)  - DDx: infectious vs metabolic abnormalities vs cardiac dz  - Afebrile, VSS, no leukocytosis, CXR non infectious, UA: noninfectious,   infections less likely. F/u Bcx, Ucx   - ED:  Lopressor 25mg PO & IV 5mg x1    - C/w  Metop  tart 25mg BID   - C/w Xarelto   - C/w Digoxin, check level   - F/u TSH  - Telemetry  - Monitor electrolyte replete as needed,  keep  Mg 2 , Phos 4   - Echo (ordered) to eval structural or valvular abnormalities  - Cardio/EP consult to be called in AM

## 2024-03-28 NOTE — PROGRESS NOTE ADULT - SUBJECTIVE AND OBJECTIVE BOX
ISAURO PALMER  83y Male  MRN:[id number]    Patient is a 83y old  Male who presents with a chief complaint of fall, Afib, Wt loss (26 Mar 2024 23:19)    HPI:  83y M pmh  A-fib on xarelto, CAD s/p stent, HTN, HLD, DM2, R eye blindness s/p CVA, RA, dementia presents accompanied by family for multiple complaints.  Per family pt had multiple witnessed and unwitnessed falls last week in [place] and at home.  Patient went to see his cardiologist a few days ago was found to be in A-fib RVR rate 150s (EKG on hand) but refused to go to the ED at that time.  Per wife he has been more lethargic than usual with unintentional  pound weight since June.   Had urinary incontinence yesterday, has been endorsing lower extremity swelling and shortness of breath.  Wife presents with paperwork  from cardiologist/PMD stating plans to workup A-fib.      ROS: Denies CP, palpitation, N/V/D, fever, cough, chills, dizziness, abm pain, recent travel, sick contact     A 10-system ROS was performed and is negative except as noted above and/or in the HPI.     (26 Mar 2024 23:19)      Patient seen and evaluated at bedside. No acute events overnight except as noted.    Interval HPI: reports to be feeling better   TELE: afib 100s    PAST MEDICAL & SURGICAL HISTORY:  Chronic atrial fibrillation      HTN (hypertension)      CAD (coronary artery disease)      No significant past surgical history          REVIEW OF SYSTEMS:  as per hpi     VITALS:   Vital Signs Last 24 Hrs  T(C): 36.4 (28 Mar 2024 11:49), Max: 37.1 (27 Mar 2024 20:33)  T(F): 97.5 (28 Mar 2024 11:49), Max: 98.8 (27 Mar 2024 20:33)  HR: 86 (28 Mar 2024 11:49) (80 - 123)  BP: 108/67 (28 Mar 2024 11:49) (98/62 - 131/76)  BP(mean): --  RR: 18 (28 Mar 2024 11:49) (18 - 18)  SpO2: 97% (28 Mar 2024 11:49) (95% - 98%)    Parameters below as of 28 Mar 2024 11:49  Patient On (Oxygen Delivery Method): room air        PHYSICAL EXAM:  GENERAL: NAD, well-developed  HEAD:  Atraumatic, Normocephalic  EYES: EOMI, PERRLA, conjunctiva and sclera clear  NECK: Supple, No JVD  CHEST/LUNG: Clear to auscultation bilaterally; No wheeze  HEART: S1, S2; irreg irreg  ABDOMEN: Soft, Nontender, Nondistended; Bowel sounds present  EXTREMITIES:  2+ Peripheral Pulses, No clubbing, cyanosis, or edema  PSYCH: Normal affect  NEUROLOGY: AAOX3;   SKIN: No rashes or lesions    Consultant(s) Notes Reviewed:  [x ] YES  [ ] NO  Care Discussed with Consultants/Other Providers [ x] YES  [ ] NO    MEDS:   MEDICATIONS  (STANDING):  aspirin enteric coated 81 milliGRAM(s) Oral daily  atorvastatin 20 milliGRAM(s) Oral at bedtime  dextrose 5%. 1000 milliLiter(s) (50 mL/Hr) IV Continuous <Continuous>  dextrose 5%. 1000 milliLiter(s) (100 mL/Hr) IV Continuous <Continuous>  dextrose 50% Injectable 12.5 Gram(s) IV Push once  dextrose 50% Injectable 25 Gram(s) IV Push once  dextrose 50% Injectable 25 Gram(s) IV Push once  digoxin     Tablet 125 MICROGram(s) Oral daily  furosemide   Injectable 40 milliGRAM(s) IV Push daily  glucagon  Injectable 1 milliGRAM(s) IntraMuscular once  insulin lispro (ADMELOG) corrective regimen sliding scale   SubCutaneous three times a day before meals  insulin lispro (ADMELOG) corrective regimen sliding scale   SubCutaneous at bedtime  methotrexate 2.5 milliGRAM(s) Oral <User Schedule>  metoprolol tartrate 25 milliGRAM(s) Oral two times a day  polyethylene glycol 3350 17 Gram(s) Oral daily  predniSONE   Tablet 2.5 milliGRAM(s) Oral daily  rivaroxaban 20 milliGRAM(s) Oral with dinner  senna 2 Tablet(s) Oral at bedtime  tamsulosin 0.4 milliGRAM(s) Oral at bedtime    MEDICATIONS  (PRN):  acetaminophen     Tablet .. 650 milliGRAM(s) Oral every 6 hours PRN Temp greater or equal to 38C (100.4F), Mild Pain (1 - 3)  aluminum hydroxide/magnesium hydroxide/simethicone Suspension 30 milliLiter(s) Oral every 4 hours PRN Dyspepsia  dextrose Oral Gel 15 Gram(s) Oral once PRN Blood Glucose LESS THAN 70 milliGRAM(s)/deciliter  melatonin 3 milliGRAM(s) Oral at bedtime PRN Insomnia  ondansetron Injectable 4 milliGRAM(s) IV Push every 8 hours PRN Nausea and/or Vomiting      ALLERGIES:  No Known Allergies      LABS:                                    10.4   10.06 )-----------( 269      ( 27 Mar 2024 07:14 )             35.2   03-28    138  |  102  |  22  ----------------------------<  189<H>  3.4<L>   |  24  |  1.03    Ca    8.4      28 Mar 2024 01:25  Phos  2.8     03-26  Mg     1.7     03-28    TPro  6.7  /  Alb  3.5  /  TBili  0.6  /  DBili  x   /  AST  18  /  ALT  21  /  AlkPhos  95  03-26    < from: TTE W or WO Ultrasound Enhancing Agent (03.27.24 @ 12:40) >  _______________________     CONCLUSIONS:      1. Left ventricular systolic function is mildly decreased with an ejection fraction visually estimated at 40 to 45 %.   2. Normal right ventricular cavity size and borderline reduced systolic function.   3. The left atrium is severely dilated.   4. The right atrium is dilated.   5. No prior echocardiogram is available for comparison.    ________________________________________________________    < end of copied text >    TSH: Thyroid Stimulating Hormone, Serum: 0.49 uIU/mL (03-27 @ 07:14)     < from: CT Abdomen and Pelvis w/ IV Cont (03.26.24 @ 18:09) >  IMPRESSION:  No sequela of acute traumatic injury in the abdomen or pelvis.    Indeterminate 3.3 cm right adrenal nodule. Recommend further evaluation   with nonemergent/outpatient abdominal MRI without and with contrast.    Heterogeneous enhancement of the liver with questionable subtle   nodular/lobulated surface of the inferior right hepatic lobe. Correlation   for underlying cirrhosis is recommended.    There are several prominent and mildly enlarged upper abdominal lymph   nodes, which are nonspecific and may be reactive given the probable   underlying liver disease.    Extensive calcified atherosclerosis of the aorta and iliofemoral arteries   with dense calcifications of the bilateral common femoral arteries. There   is at least moderate narrowing of the right common femoral artery.        --- End of Report ---    < end of copied text >  < from: CT Angio Chest PE Protocol w/ IV Cont (03.26.24 @ 18:08) >  IMPRESSION:    No pulmonary embolus.    Small pleural effusions and interlobular septal thickening suggestive of   fluid overload.    Mild emphysema.    Cardiomegaly. Dilated main pulmonary artery suggestive of pulmonary   hypertension.    --- End of Report ---        < end of copied text >  < from: CT Cervical Spine No Cont (03.26.24 @ 18:08) >  IMPRESSIONS:    Head CT: No CT evidence of acute intracranial hemorrhage.  Partially calcified mass in the left frontal extra-axial space adjacent   to the falx measuring 2.5 cm AP by 2.4 cm transverse by 1 cm thick   suggesting meningioma.  MRI with gadolinium may be helpful for further evaluation, if clinically   indicated.    C-spine CT:  No acute fracture.    --- End of Report ---      < end of copied text >  < from: Xray Pelvis AP only (03.26.24 @ 16:36) >  IMPRESSION: There is aleft total hip arthroplasty with uncemented   components in the expected position. There is no osteolysis or acute   periprosthetic fracture. There are degenerative changes of the lumbar   spine. Arterial calcifications are noted.    --- End of Report ---      < end of copied text >  
ISAURO PALMER  83y Male  MRN:[id number]    Patient is a 83y old  Male who presents with a chief complaint of fall, Afib, Wt loss (26 Mar 2024 23:19)    HPI:  83y M pmh  A-fib on xarelto, CAD s/p stent, HTN, HLD, DM2, R eye blindness s/p CVA, RA, dementia presents accompanied by family for multiple complaints.  Per family pt had multiple witnessed and unwitnessed falls last week in [place] and at home.  Patient went to see his cardiologist a few days ago was found to be in A-fib RVR rate 150s (EKG on hand) but refused to go to the ED at that time.  Per wife he has been more lethargic than usual with unintentional  pound weight since June.   Had urinary incontinence yesterday, has been endorsing lower extremity swelling and shortness of breath.  Wife presents with paperwork  from cardiologist/PMD stating plans to workup A-fib.      ROS: Denies CP, palpitation, N/V/D, fever, cough, chills, dizziness, abm pain, recent travel, sick contact     A 10-system ROS was performed and is negative except as noted above and/or in the HPI.     (26 Mar 2024 23:19)      Patient seen and evaluated at bedside. No acute events overnight except as noted.    Interval HPI: reports to be feeling better     PAST MEDICAL & SURGICAL HISTORY:  Chronic atrial fibrillation      HTN (hypertension)      CAD (coronary artery disease)      No significant past surgical history          REVIEW OF SYSTEMS:  as per hpi     VITALS:  Vital Signs Last 24 Hrs  T(C): 36.7 (27 Mar 2024 12:35), Max: 36.8 (26 Mar 2024 15:03)  T(F): 98.1 (27 Mar 2024 12:35), Max: 98.2 (26 Mar 2024 15:03)  HR: 105 (27 Mar 2024 12:35) (94 - 121)  BP: 113/65 (27 Mar 2024 12:35) (105/67 - 128/64)  BP(mean): 79 (26 Mar 2024 18:25) (79 - 83)  RR: 18 (27 Mar 2024 12:35) (18 - 20)  SpO2: 96% (27 Mar 2024 12:35) (94% - 100%)    Parameters below as of 27 Mar 2024 12:35  Patient On (Oxygen Delivery Method): room air      CAPILLARY BLOOD GLUCOSE      POCT Blood Glucose.: 139 mg/dL (27 Mar 2024 11:03)  POCT Blood Glucose.: 173 mg/dL (27 Mar 2024 07:32)  POCT Blood Glucose.: 145 mg/dL (27 Mar 2024 00:54)  POCT Blood Glucose.: 179 mg/dL (26 Mar 2024 15:21)    I&O's Summary    26 Mar 2024 07:01  -  27 Mar 2024 07:00  --------------------------------------------------------  IN: 0 mL / OUT: 1700 mL / NET: -1700 mL        PHYSICAL EXAM:  GENERAL: NAD, well-developed  HEAD:  Atraumatic, Normocephalic  EYES: EOMI, PERRLA, conjunctiva and sclera clear  NECK: Supple, No JVD  CHEST/LUNG: Clear to auscultation bilaterally; No wheeze  HEART: S1, S2; No murmurs, rubs, or gallops  ABDOMEN: Soft, Nontender, Nondistended; Bowel sounds present  EXTREMITIES:  2+ Peripheral Pulses, No clubbing, cyanosis, or edema  PSYCH: Normal affect  NEUROLOGY: AAOX3;   SKIN: No rashes or lesions    Consultant(s) Notes Reviewed:  [x ] YES  [ ] NO  Care Discussed with Consultants/Other Providers [ x] YES  [ ] NO    MEDS:  MEDICATIONS  (STANDING):  aspirin enteric coated 81 milliGRAM(s) Oral daily  atorvastatin 20 milliGRAM(s) Oral at bedtime  dextrose 5%. 1000 milliLiter(s) (50 mL/Hr) IV Continuous <Continuous>  dextrose 5%. 1000 milliLiter(s) (100 mL/Hr) IV Continuous <Continuous>  dextrose 50% Injectable 12.5 Gram(s) IV Push once  dextrose 50% Injectable 25 Gram(s) IV Push once  dextrose 50% Injectable 25 Gram(s) IV Push once  digoxin     Tablet 125 MICROGram(s) Oral daily  furosemide   Injectable 40 milliGRAM(s) IV Push daily  glucagon  Injectable 1 milliGRAM(s) IntraMuscular once  insulin lispro (ADMELOG) corrective regimen sliding scale   SubCutaneous three times a day before meals  insulin lispro (ADMELOG) corrective regimen sliding scale   SubCutaneous at bedtime  methotrexate 2.5 milliGRAM(s) Oral <User Schedule>  metoprolol tartrate 25 milliGRAM(s) Oral two times a day  polyethylene glycol 3350 17 Gram(s) Oral daily  predniSONE   Tablet 2.5 milliGRAM(s) Oral daily  rivaroxaban 20 milliGRAM(s) Oral with dinner  senna 2 Tablet(s) Oral at bedtime    MEDICATIONS  (PRN):  acetaminophen     Tablet .. 650 milliGRAM(s) Oral every 6 hours PRN Temp greater or equal to 38C (100.4F), Mild Pain (1 - 3)  aluminum hydroxide/magnesium hydroxide/simethicone Suspension 30 milliLiter(s) Oral every 4 hours PRN Dyspepsia  dextrose Oral Gel 15 Gram(s) Oral once PRN Blood Glucose LESS THAN 70 milliGRAM(s)/deciliter  melatonin 3 milliGRAM(s) Oral at bedtime PRN Insomnia  ondansetron Injectable 4 milliGRAM(s) IV Push every 8 hours PRN Nausea and/or Vomiting    ALLERGIES:  No Known Allergies      LABS:                        10.4   10.06 )-----------( 269      ( 27 Mar 2024 07:14 )             35.2     03-27    141  |  104  |  21  ----------------------------<  146<H>  4.1   |  20<L>  |  0.93    Ca    9.4      27 Mar 2024 07:14  Phos  2.8     03-26  Mg     1.4     03-27    TPro  6.7  /  Alb  3.5  /  TBili  0.6  /  DBili  x   /  AST  18  /  ALT  21  /  AlkPhos  95  03-26    PT/INR - ( 27 Mar 2024 07:14 )   PT: 29.6 sec;   INR: 2.77 ratio         PTT - ( 26 Mar 2024 15:31 )  PTT:48.8 sec      LIVER FUNCTIONS - ( 26 Mar 2024 15:31 )  Alb: 3.5 g/dL / Pro: 6.7 g/dL / ALK PHOS: 95 U/L / ALT: 21 U/L / AST: 18 U/L / GGT: x           Urinalysis Basic - ( 27 Mar 2024 07:14 )    Color: x / Appearance: x / SG: x / pH: x  Gluc: 146 mg/dL / Ketone: x  / Bili: x / Urobili: x   Blood: x / Protein: x / Nitrite: x   Leuk Esterase: x / RBC: x / WBC x   Sq Epi: x / Non Sq Epi: x / Bacteria: x      TSH: Thyroid Stimulating Hormone, Serum: 0.49 uIU/mL (03-27 @ 07:14)     < from: CT Abdomen and Pelvis w/ IV Cont (03.26.24 @ 18:09) >  IMPRESSION:  No sequela of acute traumatic injury in the abdomen or pelvis.    Indeterminate 3.3 cm right adrenal nodule. Recommend further evaluation   with nonemergent/outpatient abdominal MRI without and with contrast.    Heterogeneous enhancement of the liver with questionable subtle   nodular/lobulated surface of the inferior right hepatic lobe. Correlation   for underlying cirrhosis is recommended.    There are several prominent and mildly enlarged upper abdominal lymph   nodes, which are nonspecific and may be reactive given the probable   underlying liver disease.    Extensive calcified atherosclerosis of the aorta and iliofemoral arteries   with dense calcifications of the bilateral common femoral arteries. There   is at least moderate narrowing of the right common femoral artery.        --- End of Report ---    < end of copied text >  < from: CT Angio Chest PE Protocol w/ IV Cont (03.26.24 @ 18:08) >  IMPRESSION:    No pulmonary embolus.    Small pleural effusions and interlobular septal thickening suggestive of   fluid overload.    Mild emphysema.    Cardiomegaly. Dilated main pulmonary artery suggestive of pulmonary   hypertension.    --- End of Report ---        < end of copied text >  < from: CT Cervical Spine No Cont (03.26.24 @ 18:08) >  IMPRESSIONS:    Head CT: No CT evidence of acute intracranial hemorrhage.  Partially calcified mass in the left frontal extra-axial space adjacent   to the falx measuring 2.5 cm AP by 2.4 cm transverse by 1 cm thick   suggesting meningioma.  MRI with gadolinium may be helpful for further evaluation, if clinically   indicated.    C-spine CT:  No acute fracture.    --- End of Report ---      < end of copied text >  < from: Xray Pelvis AP only (03.26.24 @ 16:36) >  IMPRESSION: There is aleft total hip arthroplasty with uncemented   components in the expected position. There is no osteolysis or acute   periprosthetic fracture. There are degenerative changes of the lumbar   spine. Arterial calcifications are noted.    --- End of Report ---      < end of copied text >

## 2024-03-28 NOTE — DISCHARGE NOTE PROVIDER - CARE PROVIDER_API CALL
Lazaro Noel  Cardiology  [address] [facility], [address]  [place] [address]  Phone: [phone]  Fax: [phone]  Follow Up Time:     Gaurav Antoine  Cardiology  [address] [place] [address], [facility] [address]  [place] [address]  Phone: [phone]  Fax: [phone]  Follow Up Time: 1 week

## 2024-03-28 NOTE — DIETITIAN INITIAL EVALUATION ADULT - PHYSCIAL ASSESSMENT
Drug Dosing Weight (kg): 72.6 (26 Mar 2024 11:33)  Daily wt (bed scale): 73kg (3/28)    UBW: ~160lb per pt, denies significant wt change prior to admission   Wt history from previous RD notes: no history   Wt history per Ramón BAHENA: no history      ** relatively stable wt at present. RD will continue to monitor wt trends as available/able.     IBW: 148lb, 108% IBW

## 2024-03-28 NOTE — DISCHARGE NOTE PROVIDER - NSFOLLOWUPCLINICS_GEN_ALL_ED_FT
[facility] - Urology  Urology  [address], 3rd & 4th [place] [address]  Phone: [phone]  Fax:

## 2024-03-28 NOTE — DIETITIAN INITIAL EVALUATION ADULT - PROBLEM SELECTOR PLAN 1
- Clinically euvolemic   - EKG: Afib   - Trop: 65 > 64     BNP: 5540  - CTA chest: neg PE, pleural effusion, pulm htn    - IV lasix     - I&O, daily wt   - telemetry   - check echo (ordered)  - cardio consult to be called in AM   - C/w home med: BB, Statin, ASA

## 2024-03-28 NOTE — DIETITIAN INITIAL EVALUATION ADULT - NSFNSGIASSESSMENTFT_GEN_A_CORE
Denies nausea/vomiting/constipation/diarrhea. Pt reports no BM since admission, on Miralax and Senna. Will continue to monitor GI function.

## 2024-03-28 NOTE — DIETITIAN INITIAL EVALUATION ADULT - PROBLEM SELECTOR PLAN 3
S/p multiple fall at home    - On AC, No LOC   - CXR: atraumatic   - CT head/ C-spine: no ICH, acute fx or traumatic malalignment  - PRN pain control   - PT consult   - Fall precaution

## 2024-03-28 NOTE — PROVIDER CONTACT NOTE (OTHER) - ACTION/TREATMENT ORDERED:
provider made aware. 3 orders of potassium IV and 1 order of magnesium IVPB ordered for supplementation.

## 2024-03-28 NOTE — PROGRESS NOTE ADULT - PROBLEM SELECTOR PLAN 3
S/p multiple fall at home    - On AC, No LOC   - CXR: atraumatic   - CT head/ C-spine: no ICH, acute fx or traumatic malalignment  - PRN pain control   - PT consult   - Fall precaution
S/p multiple fall at home    - On AC, No LOC   - CXR: atraumatic   - CT head/ C-spine: no ICH, acute fx or traumatic malalignment  - PRN pain control   - PT consult   - Fall precaution

## 2024-03-28 NOTE — DIETITIAN INITIAL EVALUATION ADULT - OTHER INFO
-- DM: Pt is on ISS before meals and bedtime to regulate blood glucose while in house.   -- CHF: on Lasix  -- RA: on Methotrexate and Prednisone (Prednisone might elevate blood glucose)   -- GI: on MgOH, Zofran, Miralax and Senna   -- s/p MgSO4 and KCl repletions, will continue to monitor labs.   --

## 2024-03-28 NOTE — DIETITIAN INITIAL EVALUATION ADULT - REASON INDICATOR FOR ASSESSMENT
Pt seen for consult for nutrition assessment/education for Congestive Heart Failure. Information obtained from pt, RN, electronic medical record. Chart reviewed, events noted.

## 2024-03-28 NOTE — DIETITIAN INITIAL EVALUATION ADULT - NSFNSADHERENCEPTAFT_GEN_A_CORE
CHF: on Lasix  DM: on Metformin, Glipizide and Mounjaro. Most recent HbA1c 6.4 % (03-27-24) indicates good glycemic control.

## 2024-03-28 NOTE — DIETITIAN INITIAL EVALUATION ADULT - ENERGY INTAKE
Fair (50-75%) Pt reports fair PO intake ~50% to 75% of foods provided in house due to dislike some food items provided, did not voice any food preferences at present. Pt also declines oral nutrition supplements when offered. Pt made aware of menu ordering procedure in house with menu provided, encourage pt to order preferred foods to optimize PO intake while in house.

## 2024-03-28 NOTE — DIETITIAN INITIAL EVALUATION ADULT - NS FNS DIET ORDER
Diet, Consistent Carbohydrate w/Evening Snack:   DASH/TLC {Sodium & Cholesterol Restricted} (DASH)  Jelly (03-27-24 @ 17:46)

## 2024-03-28 NOTE — PROGRESS NOTE ADULT - PROBLEM SELECTOR PLAN 1
- Clinically euvolemic   - EKG: Afib   - Trop: 65 > 64     BNP: 5540  - CTA chest: neg PE, pleural effusion, pulm htn    - IV lasix as ordered  - I&O, daily wt   - telemetry   - f/u echo  - f/u cards consult.    - C/w home med: BB, Statin, ASA
- now appears euvolemic  change lasix to 40 po daily  echo noted  cards f/u  cont other cardiac meds as ordered

## 2024-03-28 NOTE — DIETITIAN INITIAL EVALUATION ADULT - PERTINENT LABORATORY DATA
03-28    138  |  102  |  22  ----------------------------<  189<H>  3.4<L>   |  24  |  1.03    Ca    8.4      28 Mar 2024 01:25  Mg     1.7     03-28    CAPILLARY BLOOD GLUCOSE  POCT Blood Glucose.: 198 mg/dL (28 Mar 2024 12:58)  POCT Blood Glucose.: 187 mg/dL (28 Mar 2024 08:53)  POCT Blood Glucose.: 239 mg/dL (27 Mar 2024 22:16)  POCT Blood Glucose.: 140 mg/dL (27 Mar 2024 17:38)    A1C with Estimated Average Glucose Result: 6.4 % (03-27-24 @ 07:14)

## 2024-03-28 NOTE — CONSULT NOTE ADULT - SUBJECTIVE AND OBJECTIVE BOX
Patient seen and evaluated @   Chief Complaint:     HPI:  83y M pmh  A-fib, CAD s/p stent, HTN, HLD, DM2, R eye blindness s/p CVA, RA, dementia presents accompanied by family for multiple complaints.  Per family pt had multiple witnessed and unwitnessed falls last week in [place] and at home.  Patient went to see his cardiologist a few days ago was found to be in A-fib RVR rate 150s (EKG on hand) but refused to go to the ED at that time.  Per wife he has been more lethargic than usual with unintentional  pound weight since .   Had urinary incontinence yesterday, has been endorsing lower extremity swelling and shortness of breath.  Has been noncompliant with Coumadin last week, family believes he was compliant this week.  Wife presents with paperwork  from cardiologist/PMD stating plans to workup A-fib.      ROS: Denies CP, palpitation, N/V/D, fever, cough, chills, dizziness, abm pain, recent travel, sick contact     A 10-system ROS was performed and is negative except as noted above and/or in the HPI.     (26 Mar 2024 23:19)    PMH:   Chronic atrial fibrillation    HTN (hypertension)    CAD (coronary artery disease)      PSH:   No significant past surgical history      Medications:   acetaminophen     Tablet .. 650 milliGRAM(s) Oral every 6 hours PRN  aluminum hydroxide/magnesium hydroxide/simethicone Suspension 30 milliLiter(s) Oral every 4 hours PRN  aspirin enteric coated 81 milliGRAM(s) Oral daily  atorvastatin 20 milliGRAM(s) Oral at bedtime  dextrose 5%. 1000 milliLiter(s) IV Continuous <Continuous>  dextrose 5%. 1000 milliLiter(s) IV Continuous <Continuous>  dextrose 50% Injectable 12.5 Gram(s) IV Push once  dextrose 50% Injectable 25 Gram(s) IV Push once  dextrose 50% Injectable 25 Gram(s) IV Push once  dextrose Oral Gel 15 Gram(s) Oral once PRN  digoxin     Tablet 125 MICROGram(s) Oral daily  glucagon  Injectable 1 milliGRAM(s) IntraMuscular once  insulin lispro (ADMELOG) corrective regimen sliding scale   SubCutaneous three times a day before meals  insulin lispro (ADMELOG) corrective regimen sliding scale   SubCutaneous at bedtime  melatonin 3 milliGRAM(s) Oral at bedtime PRN  methotrexate 2.5 milliGRAM(s) Oral <User Schedule>  metoprolol tartrate 25 milliGRAM(s) Oral two times a day  ondansetron Injectable 4 milliGRAM(s) IV Push every 8 hours PRN  polyethylene glycol 3350 17 Gram(s) Oral daily  predniSONE   Tablet 2.5 milliGRAM(s) Oral daily  rivaroxaban 20 milliGRAM(s) Oral with dinner  senna 2 Tablet(s) Oral at bedtime  tamsulosin 0.4 milliGRAM(s) Oral at bedtime    Allergies:  No Known Allergies    FAMILY HISTORY:    Social History: , retired  Smoking:  Alcohol:  Drugs:    Review of Systems:   Constitutional: No fever, chills, fatigue, or changes in weight  HEENT: No blurry vision, eye pain, headache, runny nose, or sore throat  Respiratory: No shortness of breath, cough, or wheezing  Cardiovascular: No chest pain or palpitations  Gastrointestinal: No nausea, vomiting, diarrhea, or abdominal pain  Genitourinary: No dysuria or incontinence  Extremities: No lower extremity swelling  Neurologic: No focal findings  Lymphatic: No lymphadenopathy   Skin: No rash  Psychiatry: No anxiety or depression  10 point review of systems is otherwise negative except as mentioned above            Physical Exam:  T(C): 36.6 (24 @ 18:00), Max: 36.8 (24 @ 01:22)  HR: 114 (24 @ 18:00) (86 - 115)  BP: 130/68 (24 @ 18:00) (102/60 - 130/68)  RR: 18 (24 @ 18:00) (18 - 18)  SpO2: 98% (24 @ 18:00) (95% - 98%)  Wt(kg): --     @ 07:01  -   @ 07:00  --------------------------------------------------------  IN: 850 mL / OUT: 4600 mL / NET: -3750 mL     @ 07:01  -   @ 22:15  --------------------------------------------------------  IN: 460 mL / OUT: 1140 mL / NET: -680 mL      Daily     Daily Weight in k (28 Mar 2024 05:51)    Appearance: Normal, well groomed, NAD  Eyes: PERRLA, EOMI, pink conjunctiva, no scleral icterus   HENT: Normal oral mucosa  Cardiovascular: RRR, S1, S2, no murmur, rub, or gallop; no edema; no JVD  Procedural Access Site: Clean, dry, intact, without hematoma  Respiratory: Clear to auscultation bilaterally  Gastrointestinal: Soft, non-tender, non-distended, BS+  Musculoskeletal: No clubbing or joint deformity   Neurologic: No focal weakness  Lymphatic: No lymphadenopathy  Psychiatry: AAOx3 with appropriate mood and affect  Skin: No rashes, ecchymoses, or cyanosis    Cardiovascular Diagnostic Testing:  ECG:     Echo:  < from: TTE W or WO Ultrasound Enhancing Agent (24 @ 12:40) >     CONCLUSIONS:      1. Left ventricular systolic function is mildly decreased with an ejection fraction visually estimated at 40 to 45 %.   2. Normal right ventricular cavity size and borderline reduced systolic function.   3. The left atrium is severely dilated.   4. The right atrium is dilated.   5. No prior echocardiogram is available for comparison.    ________________________________________________________________________________________    < end of copied text >      Stress Testing:    Cath:    Interpretation of Telemetry:    Imaging:    Labs:                        10.4   10.06 )-----------( 269      ( 27 Mar 2024 07:14 )             35.2     03-28    138  |  102  |  22  ----------------------------<  189<H>  3.4<L>   |  24  |  1.03    Ca    8.4      28 Mar 2024 01:25  Mg     1.7           PT/INR - ( 27 Mar 2024 07:14 )   PT: 29.6 sec;   INR: 2.77 ratio                 Total Cholesterol: 91  LDL: --  HDL: 33  T      Thyroid Stimulating Hormone, Serum: 0.49 uIU/mL ( @ 07:14)

## 2024-03-28 NOTE — PROGRESS NOTE ADULT - PROBLEM SELECTOR PLAN 8
- Hold home DM meds while inpatient  - ISS ACHS  - DASH/CC diet  - Check A1c
- Hold home DM meds while inpatient  - ISS ACHS  - DASH/CC diet

## 2024-03-28 NOTE — PROGRESS NOTE ADULT - PROBLEM SELECTOR PLAN 11
- DVT ppx: Xarelto  - GI ppx: PPI   - Diet: DASH/ CC  - PT consult
- DVT ppx: Xarelto  - GI ppx: PPI   - Diet: DASH/ CC  - PT consult

## 2024-03-28 NOTE — PROGRESS NOTE ADULT - PROBLEM SELECTOR PLAN 2
- AC with xarelto  rate control with dig and metoprolol  has been slightly rapid  consider inc metoprolol dose  cards f/u  tele monitor   - Monitor electrolyte replete as needed,  keep  Mg 2 , Phos 4
- EKG: Afib   CXR clear     - Trop: 65 > 64     BNP: 5540 ( ?HF component )  - CTA chest: no PE, (+) pleural effusion, pulm htn   - CHADSVASc: 6 (age, sex, htn, CVA hx)  - C/w  Metop  tart 25mg BID   - C/w Xarelto   - C/w Digoxin   - F/u TSH  - Telemetry  - Monitor electrolyte replete as needed,  keep  Mg 2 , Phos 4   - f/u echo  f/u cards consult

## 2024-03-28 NOTE — DISCHARGE NOTE NURSING/CASE MANAGEMENT/SOCIAL WORK - NSDCVIVACCINE_GEN_ALL_CORE_FT
Tdap; 26-Mar-2024 15:33; Marlin Lopez (JOSE); [employer]; [id number] (Exp. Date: 20-Jul-2025); IntraMuscular; Deltoid Right.; 0.5 milliLiter(s); VIS (VIS Published: 09-May-2013, VIS Presented: 26-Mar-2024);

## 2024-03-28 NOTE — DIETITIAN INITIAL EVALUATION ADULT - ADD RECOMMEND
-- Monitor PO intake, GI tolerance, skin integrity, labs, weight, and bowel movement regularity.   -- Will honor food and beverage preferences as able to maximize level of nutrient intake.  -- Assist with meals PRN and encourage PO intake.

## 2024-03-28 NOTE — DISCHARGE NOTE PROVIDER - NSDCCPCAREPLAN_GEN_ALL_CORE_FT
PRINCIPAL DISCHARGE DIAGNOSIS  Diagnosis: Atrial fibrillation  Assessment and Plan of Treatment: Atrial fibrillation is a common heart rhythm problem which increases the risk of stroke and heat attack.  It helps if you control your blood pressure, avoid alcohol, cut down on caffeine, get treatment for your thyroid if it is overactive, and perform moderate exercise in consultation with your Primary Care Provider.  Call your doctor if you experience chest tightness/pain, lightheadedness, loss of consciousness, shortness of breath (especially with exercise), feel your heart racing or beating unusually, frequent or abnormal bleeding.  It is important to take all your heart medications as prescribed.      SECONDARY DISCHARGE DIAGNOSES  Diagnosis: Fall at home  Assessment and Plan of Treatment: Imaging was negative for any fractures or dislocations   You were recommended for rehab but prefer to be discharged home    Diagnosis: Fluid overload  Assessment and Plan of Treatment: Take all medications as prescribed.  Stop smoking if you currently If you are on medication to help you quit smoking, be sure to take it as prescribed. Find healthy ways to deal with stress, such as exercise (check with your healthcare provider first), deep breathing, meditation, or enjoyable healthy hobbies.  Avoid situations that may cause you to smoke a cigarette.  Look for help with quitting; you are not alone. A resource to help you stop smoking is the [facility] – phone number 500-070-7157., and avoid high altitudes.  Weigh yourself daily.  If you gain 3lbs in 3 days, or 5lbs in a week call your Health Care Provider.  Eat a low sodium diet.  If you have pulmonary hypertension and you are a female of child bearing age, talk to your caregiver about using birth control pills or getting pregnant.  Call your Health Care Provider if you have any swelling or increased swelling in your feet, ankles, and/or stomach.  If you experience dizziness, chest pain, or shortness of breath, seek immediate medical attention.    Diagnosis: Urinary retention  Assessment and Plan of Treatment: You had a lennon placed for urinary retention   You passed your trial of void   Continue flomax as prescribed   Follow up with urology as needed for further management

## 2024-03-28 NOTE — CONSULT NOTE ADULT - ASSESSMENT
83 year-old with known history of PAF, admitted with lethargy in the setting of severe and rapid weight loss while on Mounjaro.  TTE showing mildly reduced LVEF.  No cardiovascular complaints of hcest pain, shortness of breath, or palpitations.    Discharge planning.  Uptitrate GDMT as outpatient.    Encourage exercise to maintain lean muscle mass in the setting of severe weight loss. Consider evaluation by nutritionist.

## 2024-03-28 NOTE — DISCHARGE NOTE PROVIDER - NSDCFUADDAPPT_GEN_ALL_CORE_FT
APPTS ARE READY TO BE MADE: [X] YES    Best Family or Patient Contact (if needed):    Additional Information about above appointments (if needed):    1: PCP  2: Cardiology   3:     Other comments or requests:    APPTS ARE READY TO BE MADE: [X] YES    Best Family or Patient Contact (if needed):    Additional Information about above appointments (if needed):    1: PCP  2: Cardiology   3:     Family member answered on behalf of the patient and advised they will pass forward our details for the patient to return our call.   Other comments or requests:

## 2024-03-28 NOTE — PATIENT PROFILE ADULT - FALL HARM RISK - HARM RISK INTERVENTIONS

## 2024-03-28 NOTE — DISCHARGE NOTE NURSING/CASE MANAGEMENT/SOCIAL WORK - PATIENT PORTAL LINK FT
You can access the FollowMyHealth Patient Portal offered by [facility] by registering at the following website: [email or web address]. By joining [facility]’s FollowMyHealth portal, you will also be able to view your health information using other applications (apps) compatible with our system.

## 2024-03-28 NOTE — PROGRESS NOTE ADULT - NSPROGADDITIONALINFOA_GEN_ALL_CORE
Advanced care planning was discussed with patient and family.  Advanced care planning forms were reviewed and discussed as appropriate.  Differential diagnosis and plan of care discussed with patient after the evaluation.   Pain assessed and judicious use of narcotics when appropriate was discussed.  Importance of Fall prevention discussed.  Counseling on Smoking and Alcohol cessation was offered when appropriate.  Counseling on Diet, exercise, and medication compliance was done.       Approx 75 minutes spent.
Advanced care planning was discussed with patient and family.  Advanced care planning forms were reviewed and discussed as appropriate.  Differential diagnosis and plan of care discussed with patient after the evaluation.   Pain assessed and judicious use of narcotics when appropriate was discussed.  Importance of Fall prevention discussed.  Counseling on Smoking and Alcohol cessation was offered when appropriate.  Counseling on Diet, exercise, and medication compliance was done.       Approx 75 minutes spent.

## 2024-03-28 NOTE — DIETITIAN INITIAL EVALUATION ADULT - ORAL INTAKE PTA/DIET HISTORY
Pt was eating well with no changes in appetite. Pt states he is eating 3 meals daily prepared by his wife, reports eating well-balanced meals, usually avoid foods with added salt and concentrated sweets. Follows kosher restriction. Confirms no known food allergies. Denies Hx of chewing or swallowing issues. Denies GI distress. Denies micronutrient or nutrient supplement use.

## 2024-03-28 NOTE — PROGRESS NOTE ADULT - ASSESSMENT
83y M pmh  A-fib on xarelto, CAD s/p stent, HTN, HLD, DM2, R eye blindness s/p CVA, dementia presents s/p fall and recent Afib RVR a/f further eval       
83y M pmh  A-fib on xarelto, CAD s/p stent, HTN, HLD, DM2, R eye blindness s/p CVA, dementia presents s/p fall and recent Afib RVR a/f further eval

## 2024-03-28 NOTE — PROVIDER CONTACT NOTE (OTHER) - BACKGROUND
admitted for Afib RVR, LE swelling and SOB. hx of recurrent falls, CAD, HTN, HLD, diabetes, dementia.

## 2024-03-28 NOTE — DIETITIAN INITIAL EVALUATION ADULT - OTHER CALCULATIONS
Fluid needs deferred to team. Energy and protein needs based on dosing wt of 72.6kg in consideration of Increased nutrient needs.

## 2024-03-28 NOTE — PROGRESS NOTE ADULT - PROBLEM SELECTOR PLAN 6
- Hg 11 (bL 10 -11)   - Also on AC  - Ddx: AoCD vs LIZA   - f/u iron studies, B12, folate
- Hg 11 (bL 10 -11)   - Also on AC  - Ddx: AoCD vs LIZA   stable

## 2024-03-28 NOTE — DISCHARGE NOTE PROVIDER - HOSPITAL COURSE
HPI:  83y M pmh  A-fib, CAD s/p stent, HTN, HLD, DM2, R eye blindness s/p CVA, RA, dementia presents accompanied by family for multiple complaints.  Per family pt had multiple witnessed and unwitnessed falls last week in [place] and at home.  Patient went to see his cardiologist a few days ago was found to be in A-fib RVR rate 150s (EKG on hand) but refused to go to the ED at that time.  Per wife he has been more lethargic than usual with unintentional  pound weight since June.   Had urinary incontinence yesterday, has been endorsing lower extremity swelling and shortness of breath.  Has been noncompliant with Coumadin last week, family believes he was compliant this week.  Wife presents with paperwork  from cardiologist/PMD stating plans to workup A-fib.      ROS: Denies CP, palpitation, N/V/D, fever, cough, chills, dizziness, abm pain, recent travel, sick contact     A 10-system ROS was performed and is negative except as noted above and/or in the HPI.     (26 Mar 2024 23:19)    Hospital Course:  Patient admitted for acute systolic heart failure and atrial fibrillation with RVR. Cardiology consulted; patient received IV lasix during admission and is now euvolemic. Continue oral lasix, dig metoprolol and xarelto as prescribed   Patient reports multiple falls at home. CT head/ C-spine: no ICH, acute fx or traumatic malalignment. PT recommended [facility], patient prefers home   Hospital course complicated by urinary retention. Enriquez placed in the ED; patient passed trial of void. Continue flomax as prescribed     Important Medication Changes and Reason:  >Continue flomax as prescribed for BPH    Active or Pending Issues Requiring Follow-up:  >PCP follow up within one week for further outpatient management   >Cardiology follow up for further outpatient management   >Urology follow up as needed for urinary retention     Advanced Directives:   [X] Full code  [ ] DNR  [ ] Hospice    Discharge Diagnoses:  >AFIb  >CHF  >Falls  >Urinary retention     Discharge/dispo/med rec discussed with medical attending Dr. Purcell. Patient is medically cleared for discharge with outpatient follow up

## 2024-03-28 NOTE — DIETITIAN INITIAL EVALUATION ADULT - EDUCATION DIETARY MODIFICATIONS
pt declines Congestive Heart Failure education at present, also declines education handout when offered. Will follow-up as able./(0) unable to meet; needs instruction

## 2024-03-28 NOTE — DISCHARGE NOTE PROVIDER - NSDCMRMEDTOKEN_GEN_ALL_CORE_FT
aspirin 81 mg oral delayed release tablet: 1 tab(s) orally once a day  atorvastatin 20 mg oral tablet: 1 tab(s) orally once a day (at bedtime)  digoxin 125 mcg (0.125 mg) oral tablet: 2 tab(s) orally once a day  furosemide 40 mg oral tablet: 1 tab(s) orally once a day  glipiZIDE 5 mg oral tablet: 1 tab(s) orally once a day  metFORMIN 500 mg oral tablet: 2 tab(s) orally 2 times a day  methotrexate 2.5 mg oral tablet: 7 tab(s) orally once a week Thursday.  metoprolol tartrate 25 mg oral tablet: 1 tab(s) orally 2 times a day  Mounjaro 5 mg/0.5 mL subcutaneous solution: 5 milligram(s) subcutaneously once a week Thursday. NOTE: Patient last dose was about 3 weeks ago  predniSONE 2.5 mg oral tablet: 1 tab(s) orally once a day  tamsulosin 0.4 mg oral capsule: 1 cap(s) orally once a day (at bedtime)  Xarelto 20 mg oral tablet: 1 tab(s) orally once a day

## 2024-03-28 NOTE — DIETITIAN INITIAL EVALUATION ADULT - PERTINENT MEDS FT
MEDICATIONS  (STANDING):  aspirin enteric coated 81 milliGRAM(s) Oral daily  atorvastatin 20 milliGRAM(s) Oral at bedtime  dextrose 5%. 1000 milliLiter(s) (100 mL/Hr) IV Continuous <Continuous>  dextrose 5%. 1000 milliLiter(s) (50 mL/Hr) IV Continuous <Continuous>  dextrose 50% Injectable 12.5 Gram(s) IV Push once  dextrose 50% Injectable 25 Gram(s) IV Push once  dextrose 50% Injectable 25 Gram(s) IV Push once  digoxin     Tablet 125 MICROGram(s) Oral daily  glucagon  Injectable 1 milliGRAM(s) IntraMuscular once  insulin lispro (ADMELOG) corrective regimen sliding scale   SubCutaneous three times a day before meals  insulin lispro (ADMELOG) corrective regimen sliding scale   SubCutaneous at bedtime  methotrexate 2.5 milliGRAM(s) Oral <User Schedule>  metoprolol tartrate 25 milliGRAM(s) Oral two times a day  polyethylene glycol 3350 17 Gram(s) Oral daily  predniSONE   Tablet 2.5 milliGRAM(s) Oral daily  rivaroxaban 20 milliGRAM(s) Oral with dinner  senna 2 Tablet(s) Oral at bedtime  tamsulosin 0.4 milliGRAM(s) Oral at bedtime    MEDICATIONS  (PRN):  acetaminophen     Tablet .. 650 milliGRAM(s) Oral every 6 hours PRN Temp greater or equal to 38C (100.4F), Mild Pain (1 - 3)  aluminum hydroxide/magnesium hydroxide/simethicone Suspension 30 milliLiter(s) Oral every 4 hours PRN Dyspepsia  dextrose Oral Gel 15 Gram(s) Oral once PRN Blood Glucose LESS THAN 70 milliGRAM(s)/deciliter  melatonin 3 milliGRAM(s) Oral at bedtime PRN Insomnia  ondansetron Injectable 4 milliGRAM(s) IV Push every 8 hours PRN Nausea and/or Vomiting

## 2024-03-31 LAB
CULTURE RESULTS: SIGNIFICANT CHANGE UP
CULTURE RESULTS: SIGNIFICANT CHANGE UP
SPECIMEN SOURCE: SIGNIFICANT CHANGE UP
SPECIMEN SOURCE: SIGNIFICANT CHANGE UP

## 2024-04-04 NOTE — CHART NOTE - NSCHARTNOTEFT_GEN_A_CORE
Patient was outreached but did not answer. A voicemail was left for the patient to return our call.
Family member answered on behalf of the patient and advised they will pass forward our details for the patient to return our call.

## 2024-04-10 RX ORDER — METOPROLOL TARTRATE 50 MG
1 TABLET ORAL
Refills: 0 | DISCHARGE

## 2024-04-10 RX ORDER — METFORMIN HYDROCHLORIDE 850 MG/1
2 TABLET ORAL
Refills: 0 | DISCHARGE

## 2024-04-10 RX ORDER — TIRZEPATIDE 15 MG/.5ML
5 INJECTION, SOLUTION SUBCUTANEOUS
Refills: 0 | DISCHARGE

## 2024-04-10 RX ORDER — ASPIRIN/CALCIUM CARB/MAGNESIUM 324 MG
1 TABLET ORAL
Refills: 0 | DISCHARGE

## 2024-04-10 RX ORDER — DIGOXIN 250 MCG
2 TABLET ORAL
Refills: 0 | DISCHARGE

## 2024-04-10 RX ORDER — RIVAROXABAN 15 MG-20MG
1 KIT ORAL
Refills: 0 | DISCHARGE

## 2024-04-10 RX ORDER — METHOTREXATE 2.5 MG/1
7 TABLET ORAL
Refills: 0 | DISCHARGE

## 2024-04-10 RX ORDER — ATORVASTATIN CALCIUM 80 MG/1
1 TABLET, FILM COATED ORAL
Refills: 0 | DISCHARGE

## 2024-04-10 RX ORDER — FUROSEMIDE 40 MG
1 TABLET ORAL
Refills: 0 | DISCHARGE

## 2024-04-19 ENCOUNTER — APPOINTMENT (OUTPATIENT)
Dept: CARDIOLOGY | Facility: CLINIC | Age: 84
End: 2024-04-19

## 2024-04-20 DIAGNOSIS — D50.0 IRON DEFICIENCY ANEMIA SECONDARY TO BLOOD LOSS (CHRONIC): ICD-10-CM

## 2024-04-20 DIAGNOSIS — G47.33 OBSTRUCTIVE SLEEP APNEA (ADULT) (PEDIATRIC): ICD-10-CM

## 2024-04-22 LAB
ALBUMIN SERPL ELPH-MCNC: 3.8 G/DL
ALP BLD-CCNC: 76 U/L
ALT SERPL-CCNC: 43 U/L
ANION GAP SERPL CALC-SCNC: 17 MMOL/L
AST SERPL-CCNC: 46 U/L
BILIRUB SERPL-MCNC: 0.9 MG/DL
BUN SERPL-MCNC: 17 MG/DL
CALCIUM SERPL-MCNC: 9.1 MG/DL
CHLORIDE SERPL-SCNC: 102 MMOL/L
CHOLEST SERPL-MCNC: 114 MG/DL
CO2 SERPL-SCNC: 17 MMOL/L
CREAT SERPL-MCNC: 0.97 MG/DL
EGFR: 78 ML/MIN/1.73M2
ESTIMATED AVERAGE GLUCOSE: 131 MG/DL
GLUCOSE SERPL-MCNC: 125 MG/DL
HBA1C MFR BLD HPLC: 6.2 %
HCT VFR BLD CALC: 34.4 %
HDLC SERPL-MCNC: 53 MG/DL
HGB BLD-MCNC: 10 G/DL
LDLC SERPL CALC-MCNC: 45 MG/DL
MCHC RBC-ENTMCNC: 27.9 PG
MCHC RBC-ENTMCNC: 29.1 GM/DL
MCV RBC AUTO: 95.8 FL
NONHDLC SERPL-MCNC: 61 MG/DL
PLATELET # BLD AUTO: 246 K/UL
POTASSIUM SERPL-SCNC: 5.2 MMOL/L
PROT SERPL-MCNC: 6 G/DL
RBC # BLD: 3.59 M/UL
RBC # FLD: 18.7 %
SODIUM SERPL-SCNC: 137 MMOL/L
TRIGL SERPL-MCNC: 79 MG/DL
WBC # FLD AUTO: 8.11 K/UL

## 2024-04-22 RX ORDER — ATORVASTATIN CALCIUM 40 MG/1
40 TABLET, FILM COATED ORAL
Qty: 90 | Refills: 2 | Status: DISCONTINUED | COMMUNITY
Start: 2016-08-02 | End: 2024-04-22

## 2024-04-24 LAB
T4 FREE SERPL-MCNC: 1.2 NG/DL
TSH SERPL-ACNC: 5.31 UIU/ML

## 2024-05-24 DIAGNOSIS — N40.1 BENIGN PROSTATIC HYPERPLASIA WITH LOWER URINARY TRACT SYMPMS: ICD-10-CM

## 2024-05-24 RX ORDER — ESCITALOPRAM OXALATE 5 MG/1
5 TABLET ORAL DAILY
Qty: 90 | Refills: 3 | Status: ACTIVE | COMMUNITY
Start: 2024-05-24 | End: 1900-01-01

## 2024-05-24 RX ORDER — TAMSULOSIN HYDROCHLORIDE 0.4 MG/1
0.4 CAPSULE ORAL
Qty: 90 | Refills: 3 | Status: ACTIVE | COMMUNITY
Start: 2024-05-24 | End: 1900-01-01

## 2024-06-03 ENCOUNTER — APPOINTMENT (OUTPATIENT)
Dept: CARDIOLOGY | Facility: CLINIC | Age: 84
End: 2024-06-03
Payer: MEDICARE

## 2024-06-03 ENCOUNTER — NON-APPOINTMENT (OUTPATIENT)
Age: 84
End: 2024-06-03

## 2024-06-03 VITALS
SYSTOLIC BLOOD PRESSURE: 130 MMHG | BODY MASS INDEX: 27 KG/M2 | DIASTOLIC BLOOD PRESSURE: 70 MMHG | WEIGHT: 168 LBS | OXYGEN SATURATION: 99 % | HEART RATE: 100 BPM | HEIGHT: 66 IN

## 2024-06-03 DIAGNOSIS — I10 ESSENTIAL (PRIMARY) HYPERTENSION: ICD-10-CM

## 2024-06-03 DIAGNOSIS — I51.89 OTHER ILL-DEFINED HEART DISEASES: ICD-10-CM

## 2024-06-03 DIAGNOSIS — E11.9 TYPE 2 DIABETES MELLITUS W/OUT COMPLICATIONS: ICD-10-CM

## 2024-06-03 DIAGNOSIS — I48.0 PAROXYSMAL ATRIAL FIBRILLATION: ICD-10-CM

## 2024-06-03 DIAGNOSIS — R19.7 DIARRHEA, UNSPECIFIED: ICD-10-CM

## 2024-06-03 PROBLEM — I25.10 ATHEROSCLEROTIC HEART DISEASE OF NATIVE CORONARY ARTERY WITHOUT ANGINA PECTORIS: Chronic | Status: ACTIVE | Noted: 2024-03-26

## 2024-06-03 PROBLEM — I48.20 CHRONIC ATRIAL FIBRILLATION, UNSPECIFIED: Chronic | Status: ACTIVE | Noted: 2024-03-26

## 2024-06-03 PROCEDURE — 93000 ELECTROCARDIOGRAM COMPLETE: CPT

## 2024-06-03 PROCEDURE — G2211 COMPLEX E/M VISIT ADD ON: CPT

## 2024-06-03 PROCEDURE — 99215 OFFICE O/P EST HI 40 MIN: CPT

## 2024-06-03 RX ORDER — TIRZEPATIDE 7.5 MG/.5ML
7.5 INJECTION, SOLUTION SUBCUTANEOUS
Qty: 3 | Refills: 3 | Status: ACTIVE | COMMUNITY
Start: 2022-10-12 | End: 1900-01-01

## 2024-06-03 RX ORDER — ZOLPIDEM TARTRATE 5 MG/1
5 TABLET ORAL
Qty: 30 | Refills: 1 | Status: DISCONTINUED | COMMUNITY
Start: 2021-03-31 | End: 2024-06-03

## 2024-06-03 NOTE — REASON FOR VISIT
[FreeTextEntry1] : Patient is here to follow-up hypertension, hypercholesterolemia, obesity, diastolic dysfunction, paroxysmal atrial relation, diabetes and recent difficulties with diarrhea.  Since beginning Mounjaro, the patient is down over 80 pounds.  His most recent hemoglobin A1c was 6.2.  Except for the approximately 1 month of diarrhea several days a week, the patient seems to be doing well.  His breathing is better.  Blood pressure is well-controlled.  He does have some ongoing difficulties with pain in his feet when he walks.  He is scheduled to see a vascular surgeon although does not appear to be vascular.  He did see a podiatrist who did not see any obvious explanation for the pain.The patient also complains of severe insomnia.  He rarely sleeps 1 to 2 hours a night.  The patient tried melatonin several years ago with no improvement.  We discussed the possibility of CBD which the patient will try.  Further recommendations thereafter.

## 2024-06-03 NOTE — REVIEW OF SYSTEMS
"Subjective:      Gladys Fulton is a 49 y.o. female who presents with Cough            HPI Comments: Medications, Allergies and Prior Medical Hx reviewed and updated in [facility].with patient/family today     Pt states initially became ill 1 mos ago sx improved and then returned several days ago with productive cough and nasal congestion.           Cough  This is a new problem. The current episode started more than 1 month ago. The problem has been waxing and waning. The cough is productive of sputum. Associated symptoms include nasal congestion and rhinorrhea. Pertinent negatives include no chills, ear pain, fever, myalgias, sore throat or shortness of breath. Nothing aggravates the symptoms. She has tried OTC cough suppressant for the symptoms. The treatment provided mild relief. There is no history of asthma, bronchiectasis, bronchitis, COPD, emphysema, environmental allergies or pneumonia.       Review of Systems   Constitutional: Positive for malaise/fatigue. Negative for fever and chills.   HENT: Positive for congestion and rhinorrhea. Negative for ear discharge, ear pain and sore throat.    Respiratory: Positive for cough and sputum production. Negative for shortness of breath.    Gastrointestinal: Negative for nausea, vomiting and diarrhea.   Musculoskeletal: Negative for myalgias.   Neurological: Negative for weakness.   Endo/Heme/Allergies: Negative for environmental allergies.          Objective:     /90 mmHg  Pulse 103  Temp(Src) 37.7 °C (99.8 °F)  Resp 20  Ht 1.803 m (5' 11\")  Wt 113.399 kg (250 lb)  BMI 34.88 kg/m2  SpO2 98%     Physical Exam   Constitutional: She appears well-developed and well-nourished. No distress.   HENT:   Head: Normocephalic and atraumatic.   Right Ear: Tympanic membrane and ear canal normal.   Left Ear: Tympanic membrane and ear canal normal.   Nose: Rhinorrhea present.   Mouth/Throat: Uvula is midline and mucous membranes are normal. Posterior oropharyngeal edema and " posterior oropharyngeal erythema present. No oropharyngeal exudate.   Eyes: Conjunctivae are normal. Pupils are equal, round, and reactive to light.   Neck: Neck supple.   Cardiovascular: Normal rate, regular rhythm and normal heart sounds.    Pulmonary/Chest: Effort normal and breath sounds normal. No respiratory distress. She has no wheezes.   Lymphadenopathy:     She has cervical adenopathy.   Neurological: She is alert.   Skin: Skin is warm and dry.   Psychiatric: She has a normal mood and affect. Her behavior is normal.   Vitals reviewed.              Assessment/Plan:       1. Acute bronchitis, unspecified organism  doxycycline (VIBRAMYCIN) 100 MG Cap       Epic generated written imformation provided along with verbal instructions for bronchitis      Rest, Fluids, tylenol, ibuprofen, humidified air, and otc cough meds  Pt will go to the ER for worsening or changing symptoms as discussed, follow-up with your primary care provider or return here if not improving in 7 days   Discharge instructions discussed with pt/family who verbalize understanding and agreement with poc     [TextEntry] : Except as noted above... Constitutional: The patient denied headache, fatigue, fever, sweats, loss of appetite or chills Eyes: The patient denied double vision, eye pain, eye discharge, red eyes or itchy eyes ENT: The patient denied ear pain, ear discharge, nasal congestion, nasal discharge, sore throat, enlarged tonsils, hoarseness, neck pain or neck swelling Cardiovascular: The patient denied chest pain, chest discomfort, dizziness, palpitations, fainting  or leg cramps Respiratory: The patient denied shortness of breath, cough, coughing up blood, wheezing, chest congestion or mucous production GI: The patient denied abdominal pain, nausea, vomiting, diarrhea, constipation, black stools or bloody stools : The patient denied pain on urination, burning with urination, frequent urination or blood in the urine Skin: The patient denied rashes, redness or swelling Neurologic: The patient denied headache, stiff neck, weakness, numbness, difficulty speaking, unsteadiness or numbness/tingling in feet Psychiatric: The patient denied hallucinations, agitation or disorientation Endocrine: The patient denied excessive thirst, excessive urination, cold intolerance or heat intolerance Hematologic: The patient denied easy bruisability or pallor Allergic/Immunologic: The patient denied runny nose, recurrent infections, hives or pruritis Musculoskeletal: The patient denied arthritic pains, muscle weakness or muscle aches Extremities: The patient denied clubbing, cyanosis or lower extremity swelling

## 2024-06-03 NOTE — DISCUSSION/SUMMARY
[FreeTextEntry1] : Insomnia-as above Diabetes-as above. Continue Mounjaro. Hypertension-well-controlled Obesity-Remarkable improvement with Mounjaro.  Continue withdrawal for now.  Diarrhea-patient will try Benefiber 1 to 2 packets daily.  If diarrhea continues, consider stool culture although the patient has not been on any antibiotics recently.. Return visit 2 months with email follow-up over the next few weeks.   Total time of the encounter: 45 minutes which included but was not limited to the following: Face-to-face and non face-to-face time personally spent by the physician preparing to see the patient, obtaining and/or resuming separately obtained history, performing a medically appropriate examination and/or evaluation, counseling and educating the patient/family/caregiver, ordering medications, tests or procedures, referring and communicating with other healthcare professionals, documenting clinical information in the electronic health record, independently interpreting results and communicated results to the patient/family/caregiver and care coordination.   [EKG obtained to assist in diagnosis and management of assessed problem(s)] : EKG obtained to assist in diagnosis and management of assessed problem(s)

## 2024-06-03 NOTE — PHYSICAL EXAM
[TextEntry] : General/Constitutional: WD/ WN in NAD H: NC/AT Eyes:  PERRL, sclerae and conjunctivae normal without jaundice or xanthelasma; ENMT:normal teeth, gums and palate with no petechiae, pallor or cyanosis Neck: w/o JVD, thyromegaly or adenopathy; normal venous contour Respiratory: clear to auscultation, normal respiratory effort with no retractions or use of accessory respiratory muscles Heart: [id number], regular rate, normal S1, S2 without murmurs, rubs, gallops, heaves or thrills Vascular exam: normal carotid upstrokes without carotid or abdominal bruits. 2+/2+ pulses to posterior tibialis and dorsalis pedis Abdomen: soft, nontender, bowels sounds normal without hepatomegaly or splenomegaly, masses or bruits Musculoskeletal:without significant kyphosis or scoliosis Extremities: w/o CCE, good capillary filling Skin: no stasis changes; no ulcers  Neuro: AA and O x 3; no focal neurologic deficits Psych: normal mood and affect

## 2024-06-20 ENCOUNTER — RX RENEWAL (OUTPATIENT)
Age: 84
End: 2024-06-20

## 2024-06-20 RX ORDER — DIGOXIN 125 UG/1
125 TABLET ORAL DAILY
Qty: 90 | Refills: 0 | Status: ACTIVE | COMMUNITY
Start: 2023-01-12 | End: 1900-01-01

## 2024-06-20 RX ORDER — METOPROLOL TARTRATE 25 MG/1
25 TABLET, FILM COATED ORAL
Qty: 180 | Refills: 3 | Status: ACTIVE | COMMUNITY
Start: 2023-01-29 | End: 1900-01-01

## 2024-09-13 ENCOUNTER — RX RENEWAL (OUTPATIENT)
Age: 84
End: 2024-09-13

## 2024-09-17 ENCOUNTER — RX RENEWAL (OUTPATIENT)
Age: 84
End: 2024-09-17

## 2024-09-25 ENCOUNTER — RX RENEWAL (OUTPATIENT)
Age: 84
End: 2024-09-25

## 2024-09-29 ENCOUNTER — NON-APPOINTMENT (OUTPATIENT)
Age: 84
End: 2024-09-29

## 2024-10-22 ENCOUNTER — RX RENEWAL (OUTPATIENT)
Age: 84
End: 2024-10-22

## 2024-10-23 ENCOUNTER — RX RENEWAL (OUTPATIENT)
Age: 84
End: 2024-10-23

## 2024-10-28 ENCOUNTER — APPOINTMENT (OUTPATIENT)
Dept: CARDIOLOGY | Facility: CLINIC | Age: 84
End: 2024-10-28
Payer: MEDICARE

## 2024-10-28 ENCOUNTER — NON-APPOINTMENT (OUTPATIENT)
Age: 84
End: 2024-10-28

## 2024-10-28 VITALS
WEIGHT: 172 LBS | OXYGEN SATURATION: 98 % | DIASTOLIC BLOOD PRESSURE: 60 MMHG | BODY MASS INDEX: 27.76 KG/M2 | SYSTOLIC BLOOD PRESSURE: 100 MMHG | HEART RATE: 95 BPM

## 2024-10-28 DIAGNOSIS — G47.33 OBSTRUCTIVE SLEEP APNEA (ADULT) (PEDIATRIC): ICD-10-CM

## 2024-10-28 DIAGNOSIS — I10 ESSENTIAL (PRIMARY) HYPERTENSION: ICD-10-CM

## 2024-10-28 DIAGNOSIS — E11.9 TYPE 2 DIABETES MELLITUS W/OUT COMPLICATIONS: ICD-10-CM

## 2024-10-28 DIAGNOSIS — R06.02 SHORTNESS OF BREATH: ICD-10-CM

## 2024-10-28 DIAGNOSIS — Z00.00 ENCOUNTER FOR GENERAL ADULT MEDICAL EXAMINATION W/OUT ABNORMAL FINDINGS: ICD-10-CM

## 2024-10-28 DIAGNOSIS — I51.89 OTHER ILL-DEFINED HEART DISEASES: ICD-10-CM

## 2024-10-28 DIAGNOSIS — E78.00 PURE HYPERCHOLESTEROLEMIA, UNSPECIFIED: ICD-10-CM

## 2024-10-28 DIAGNOSIS — F51.04 PSYCHOPHYSIOLOGIC INSOMNIA: ICD-10-CM

## 2024-10-28 DIAGNOSIS — I48.0 PAROXYSMAL ATRIAL FIBRILLATION: ICD-10-CM

## 2024-10-28 PROCEDURE — 99214 OFFICE O/P EST MOD 30 MIN: CPT

## 2024-10-28 PROCEDURE — G2211 COMPLEX E/M VISIT ADD ON: CPT

## 2024-10-28 PROCEDURE — 93000 ELECTROCARDIOGRAM COMPLETE: CPT

## 2024-10-28 RX ORDER — ZOLPIDEM TARTRATE 6.25 MG/1
6.25 TABLET, EXTENDED RELEASE ORAL
Qty: 60 | Refills: 1 | Status: ACTIVE | COMMUNITY
Start: 2024-10-28 | End: 1900-01-01

## 2024-10-29 LAB
ALBUMIN SERPL ELPH-MCNC: 4.3 G/DL
ALP BLD-CCNC: 68 U/L
ALT SERPL-CCNC: 20 U/L
ANION GAP SERPL CALC-SCNC: 18 MMOL/L
AST SERPL-CCNC: 22 U/L
BILIRUB SERPL-MCNC: 0.9 MG/DL
BUN SERPL-MCNC: 20 MG/DL
CALCIUM SERPL-MCNC: 9.7 MG/DL
CHLORIDE SERPL-SCNC: 102 MMOL/L
CHOLEST SERPL-MCNC: 116 MG/DL
CO2 SERPL-SCNC: 23 MMOL/L
CREAT SERPL-MCNC: 1.31 MG/DL
EGFR: 54 ML/MIN/1.73M2
ESTIMATED AVERAGE GLUCOSE: 137 MG/DL
GLUCOSE SERPL-MCNC: 104 MG/DL
HBA1C MFR BLD HPLC: 6.4 %
HCT VFR BLD CALC: 41 %
HDLC SERPL-MCNC: 40 MG/DL
HGB BLD-MCNC: 12.4 G/DL
LDLC SERPL CALC-MCNC: 51 MG/DL
MCHC RBC-ENTMCNC: 29.8 PG
MCHC RBC-ENTMCNC: 30.2 GM/DL
MCV RBC AUTO: 98.6 FL
NONHDLC SERPL-MCNC: 76 MG/DL
PLATELET # BLD AUTO: 196 K/UL
POTASSIUM SERPL-SCNC: 4.9 MMOL/L
PROT SERPL-MCNC: 6.4 G/DL
PSA FREE FLD-MCNC: 55 %
PSA FREE SERPL-MCNC: 0.15 NG/ML
PSA SERPL-MCNC: 0.27 NG/ML
RBC # BLD: 4.16 M/UL
RBC # FLD: 17 %
SODIUM SERPL-SCNC: 143 MMOL/L
T4 FREE SERPL-MCNC: 1.1 NG/DL
TRIGL SERPL-MCNC: 144 MG/DL
TSH SERPL-ACNC: 2.7 UIU/ML
WBC # FLD AUTO: 7.23 K/UL

## 2025-01-06 ENCOUNTER — NON-APPOINTMENT (OUTPATIENT)
Age: 85
End: 2025-01-06

## 2025-01-06 ENCOUNTER — APPOINTMENT (OUTPATIENT)
Dept: CARDIOLOGY | Facility: CLINIC | Age: 85
End: 2025-01-06
Payer: MEDICARE

## 2025-01-06 VITALS
HEART RATE: 87 BPM | DIASTOLIC BLOOD PRESSURE: 70 MMHG | BODY MASS INDEX: 27.92 KG/M2 | OXYGEN SATURATION: 100 % | SYSTOLIC BLOOD PRESSURE: 132 MMHG | WEIGHT: 173 LBS

## 2025-01-06 DIAGNOSIS — E78.00 PURE HYPERCHOLESTEROLEMIA, UNSPECIFIED: ICD-10-CM

## 2025-01-06 DIAGNOSIS — M48.061 SPINAL STENOSIS, LUMBAR REGION WITHOUT NEUROGENIC CLAUDICATION: ICD-10-CM

## 2025-01-06 DIAGNOSIS — E11.9 TYPE 2 DIABETES MELLITUS W/OUT COMPLICATIONS: ICD-10-CM

## 2025-01-06 DIAGNOSIS — I51.89 OTHER ILL-DEFINED HEART DISEASES: ICD-10-CM

## 2025-01-06 DIAGNOSIS — I10 ESSENTIAL (PRIMARY) HYPERTENSION: ICD-10-CM

## 2025-01-06 PROCEDURE — 99214 OFFICE O/P EST MOD 30 MIN: CPT

## 2025-01-06 PROCEDURE — 93000 ELECTROCARDIOGRAM COMPLETE: CPT

## 2025-01-06 PROCEDURE — G2211 COMPLEX E/M VISIT ADD ON: CPT

## 2025-02-26 ENCOUNTER — NON-APPOINTMENT (OUTPATIENT)
Age: 85
End: 2025-02-26

## 2025-03-10 ENCOUNTER — RX RENEWAL (OUTPATIENT)
Age: 85
End: 2025-03-10

## 2025-03-10 RX ORDER — TIRZEPATIDE 7.5 MG/.5ML
7.5 INJECTION, SOLUTION SUBCUTANEOUS
Qty: 3 | Refills: 3 | Status: ACTIVE | COMMUNITY
Start: 2025-03-10 | End: 1900-01-01

## 2025-05-14 ENCOUNTER — NON-APPOINTMENT (OUTPATIENT)
Age: 85
End: 2025-05-14

## 2025-07-20 LAB
ALBUMIN SERPL ELPH-MCNC: 4.4 G/DL
ALP BLD-CCNC: 102 U/L
ALT SERPL-CCNC: 18 U/L
ANION GAP SERPL CALC-SCNC: 16 MMOL/L
AST SERPL-CCNC: 26 U/L
BASOPHILS # BLD AUTO: 0.06 K/UL
BASOPHILS NFR BLD AUTO: 0.9 %
BILIRUB SERPL-MCNC: 1.4 MG/DL
BUN SERPL-MCNC: 23 MG/DL
CALCIUM SERPL-MCNC: 9.8 MG/DL
CHLORIDE SERPL-SCNC: 99 MMOL/L
CHOLEST SERPL-MCNC: 123 MG/DL
CO2 SERPL-SCNC: 24 MMOL/L
CREAT SERPL-MCNC: 1.06 MG/DL
EGFRCR SERPLBLD CKD-EPI 2021: 70 ML/MIN/1.73M2
EOSINOPHIL # BLD AUTO: 0.21 K/UL
EOSINOPHIL NFR BLD AUTO: 3 %
ESTIMATED AVERAGE GLUCOSE: 134 MG/DL
GLUCOSE SERPL-MCNC: 135 MG/DL
HBA1C MFR BLD HPLC: 6.3 %
HCT VFR BLD CALC: 41.1 %
HDLC SERPL-MCNC: 48 MG/DL
HGB BLD-MCNC: 12.8 G/DL
IMM GRANULOCYTES NFR BLD AUTO: 0.3 %
LDLC SERPL-MCNC: 55 MG/DL
LYMPHOCYTES # BLD AUTO: 1.2 K/UL
LYMPHOCYTES NFR BLD AUTO: 17.1 %
MAN DIFF?: NORMAL
MCHC RBC-ENTMCNC: 27.9 PG
MCHC RBC-ENTMCNC: 31.1 G/DL
MCV RBC AUTO: 89.5 FL
MONOCYTES # BLD AUTO: 0.3 K/UL
MONOCYTES NFR BLD AUTO: 4.3 %
NEUTROPHILS # BLD AUTO: 5.23 K/UL
NEUTROPHILS NFR BLD AUTO: 74.4 %
NONHDLC SERPL-MCNC: 75 MG/DL
PLATELET # BLD AUTO: 214 K/UL
POTASSIUM SERPL-SCNC: 4.2 MMOL/L
PROT SERPL-MCNC: 7 G/DL
RBC # BLD: 4.59 M/UL
RBC # FLD: 15.1 %
SODIUM SERPL-SCNC: 139 MMOL/L
T4 FREE SERPL-MCNC: 1.2 NG/DL
TRIGL SERPL-MCNC: 113 MG/DL
TSH SERPL-ACNC: 3.42 UIU/ML
WBC # FLD AUTO: 7.02 K/UL

## 2025-08-28 ENCOUNTER — APPOINTMENT (OUTPATIENT)
Dept: CARDIOLOGY | Facility: CLINIC | Age: 85
End: 2025-08-28
Payer: MEDICARE

## 2025-08-28 VITALS
SYSTOLIC BLOOD PRESSURE: 126 MMHG | BODY MASS INDEX: 27 KG/M2 | DIASTOLIC BLOOD PRESSURE: 78 MMHG | OXYGEN SATURATION: 97 % | WEIGHT: 168 LBS | HEIGHT: 66 IN | HEART RATE: 76 BPM

## 2025-08-28 DIAGNOSIS — G47.00 INSOMNIA, UNSPECIFIED: ICD-10-CM

## 2025-08-28 DIAGNOSIS — R41.3 OTHER AMNESIA: ICD-10-CM

## 2025-08-28 DIAGNOSIS — I51.89 OTHER ILL-DEFINED HEART DISEASES: ICD-10-CM

## 2025-08-28 DIAGNOSIS — R45.1 RESTLESSNESS AND AGITATION: ICD-10-CM

## 2025-08-28 PROCEDURE — 99215 OFFICE O/P EST HI 40 MIN: CPT

## 2025-08-28 PROCEDURE — G2211 COMPLEX E/M VISIT ADD ON: CPT

## 2025-08-28 PROCEDURE — 93000 ELECTROCARDIOGRAM COMPLETE: CPT

## 2025-08-28 RX ORDER — QUETIAPINE FUMARATE 25 MG/1
25 TABLET ORAL
Qty: 90 | Refills: 0 | Status: ACTIVE | COMMUNITY
Start: 2025-08-28 | End: 1900-01-01

## (undated) DEVICE — VENODYNE/SCD SLEEVE CALF MEDIUM

## (undated) DEVICE — ELCTR GROUNDING PAD ADULT COVIDIEN

## (undated) DEVICE — POSITIONER STRAP ARMBOARD VELCRO TS-30

## (undated) DEVICE — GLV 7.5 PROTEXIS (CREAM) MICRO

## (undated) DEVICE — BAG URINE W METER 2L

## (undated) DEVICE — SYR CATH TIP 2 OZ

## (undated) DEVICE — PACK CYSTO

## (undated) DEVICE — GLV 7.5 PROTEXIS (WHITE)

## (undated) DEVICE — FOLEY CATH 2-WAY 20F 5CC LATEX LUBRICATH

## (undated) DEVICE — ELCTR PLASMA LOOP MEDIUM 24FR 12-30 DEG

## (undated) DEVICE — SOL IRR POUR H2O 1500ML

## (undated) DEVICE — SOL IRR BAG H2O 3000ML

## (undated) DEVICE — CANISTER DISPOSABLE THIN WALL 3000CC

## (undated) DEVICE — CABLE DAC ACTIVE CORD

## (undated) DEVICE — WARMING BLANKET UPPER ADULT

## (undated) DEVICE — TUBING LEVEL ONE NORMOFLO SET